# Patient Record
Sex: FEMALE | Race: WHITE | NOT HISPANIC OR LATINO | Employment: FULL TIME | URBAN - METROPOLITAN AREA
[De-identification: names, ages, dates, MRNs, and addresses within clinical notes are randomized per-mention and may not be internally consistent; named-entity substitution may affect disease eponyms.]

---

## 2017-04-13 ENCOUNTER — OFFICE VISIT (OUTPATIENT)
Dept: FAMILY MEDICINE | Facility: CLINIC | Age: 52
End: 2017-04-13
Payer: COMMERCIAL

## 2017-04-13 VITALS
HEIGHT: 65 IN | SYSTOLIC BLOOD PRESSURE: 110 MMHG | DIASTOLIC BLOOD PRESSURE: 66 MMHG | RESPIRATION RATE: 16 BRPM | OXYGEN SATURATION: 97 % | BODY MASS INDEX: 34.56 KG/M2 | WEIGHT: 207.44 LBS | HEART RATE: 76 BPM

## 2017-04-13 DIAGNOSIS — R09.81 NASAL CONGESTION: ICD-10-CM

## 2017-04-13 DIAGNOSIS — S81.832S: ICD-10-CM

## 2017-04-13 DIAGNOSIS — Z00.00 ANNUAL PHYSICAL EXAM: Primary | ICD-10-CM

## 2017-04-13 PROCEDURE — 99396 PREV VISIT EST AGE 40-64: CPT | Mod: S$GLB,,, | Performed by: NURSE PRACTITIONER

## 2017-04-13 PROCEDURE — 99999 PR PBB SHADOW E&M-EST. PATIENT-LVL III: CPT | Mod: PBBFAC,,, | Performed by: NURSE PRACTITIONER

## 2017-04-13 PROCEDURE — 1160F RVW MEDS BY RX/DR IN RCRD: CPT | Mod: S$GLB,,, | Performed by: NURSE PRACTITIONER

## 2017-04-13 RX ORDER — MUPIROCIN 20 MG/G
OINTMENT TOPICAL 2 TIMES DAILY
Qty: 15 G | Refills: 1 | Status: SHIPPED | OUTPATIENT
Start: 2017-04-13 | End: 2023-11-29

## 2017-04-13 RX ORDER — LEVOCETIRIZINE DIHYDROCHLORIDE 5 MG/1
5 TABLET, FILM COATED ORAL NIGHTLY
Qty: 30 TABLET | Refills: 2 | Status: SHIPPED | OUTPATIENT
Start: 2017-04-13 | End: 2023-11-29

## 2017-04-13 RX ORDER — PROMETHAZINE HYDROCHLORIDE 12.5 MG/1
25 TABLET ORAL 2 TIMES DAILY PRN
COMMUNITY
End: 2023-11-29

## 2017-04-13 RX ORDER — FLUTICASONE PROPIONATE 50 MCG
1 SPRAY, SUSPENSION (ML) NASAL DAILY
Qty: 1 BOTTLE | Refills: 3 | Status: SHIPPED | OUTPATIENT
Start: 2017-04-13 | End: 2017-06-13 | Stop reason: SDUPTHER

## 2017-04-13 NOTE — MR AVS SNAPSHOT
[facility]  [address]  [facility] [address]  Phone: [phone]  Fax: [phone]                  Rachel Boswell   2017 1:40 PM   Office Visit    Description:  Female : 1965   Provider:  Temitope Ayers NP   Department:  [facility]           Reason for Visit     Sinusitis     spot on leg           Diagnoses this Visit        Comments    Nasal congestion    -  Primary     Puncture wound, lower leg, left, sequela         Annual physical exam                To Do List           Future Appointments        Provider Department Dept Phone    2017 11:10 AM LAB, [place] [facility] [phone]      Goals (5 Years of Data)     None      Follow-Up and Disposition     Return if symptoms worsen or fail to improve.       These Medications        Disp Refills Start End    mupirocin (BACTROBAN) 2 % ointment 15 g 1 2017     Apply topically 2 (two) times daily. - Topical (Top)    Pharmacy: [facility] [address] [place] [address] [facility] AT [place] [address] & [employer] [facility] Ph #: [phone]       levocetirizine (XYZAL) 5 MG tablet 30 tablet 2 2017    Take 1 tablet (5 mg total) by mouth every evening. antihistamine - Oral    Pharmacy: [facility] [address] [place] [address] [facility] AT [facility] [address] & [employer] Ph #: [phone]       fluticasone (FLONASE) 50 mcg/actuation nasal spray 1 Bottle 3 2017     1 spray by Each Nare route once daily. - Each Nare    Pharmacy: [facility] [address] [place] [address] [facility] AT [facility] [address] & [employer] [facility] [facility] Ph #: [phone]         [facility]     [facility] Nurse Care Line - 24/ Assistance  Unless otherwise directed by your provider, please contact [facility], our nurse care line that is available for 24/7 assistance.     Registered nurses in the [facility] provide: appointment scheduling, clinical  advisement, health education, and other advisory services.  Call: [phone] (toll free)               Medications           Message regarding Medications     Verify the changes and/or additions to your medication regime listed below are the same as discussed with your clinician today.  If any of these changes or additions are incorrect, please notify your healthcare provider.        START taking these NEW medications        Refills    mupirocin (BACTROBAN) 2 % ointment 1    Sig: Apply topically 2 (two) times daily.    Class: Normal    Route: Topical (Top)    levocetirizine (XYZAL) 5 MG tablet 2    Sig: Take 1 tablet (5 mg total) by mouth every evening. antihistamine    Class: Normal    Route: Oral    fluticasone (FLONASE) 50 mcg/actuation nasal spray 3    Si spray by Each Nare route once daily.    Class: Normal    Route: Each Nare      STOP taking these medications     ondansetron (ZOFRAN-ODT) 4 MG TbDL Take 2 tablets (8 mg total) by mouth every 8 (eight) hours as needed.    hydrocodone-acetaminophen 5-325mg (NORCO) 5-325 mg per tablet Take 1 tablet by mouth every 4 (four) hours as needed for Pain.           Verify that the below list of medications is an accurate representation of the medications you are currently taking.  If none reported, the list may be blank. If incorrect, please contact your healthcare provider. Carry this list with you in case of emergency.           Current Medications     CARISOPRODOL (SOMA ORAL) Take 1 mg by mouth once daily.    diphenhydrAMINE (BENADRYL) 25 mg capsule Take 25 mg by mouth nightly as needed for Insomnia.    eletriptan (RELPAX) 40 MG tablet Take 40 mg by mouth as needed. may repeat in 2 hours if necessary    fluticasone (FLONASE) 50 mcg/actuation nasal spray 1 spray by Each Nare route once daily.    levocetirizine (XYZAL) 5 MG tablet Take 1 tablet (5 mg total) by mouth every evening. antihistamine    mupirocin (BACTROBAN) 2 % ointment Apply topically 2 (two) times  "daily.    promethazine (PHENERGAN) 12.5 MG Tab Take 25 mg by mouth 2 (two) times daily as needed.    propranolol (INDERAL) 80 MG tablet Take 80 mg by mouth 3 (three) times daily.    ranitidine (ZANTAC) 150 MG tablet Take 150 mg by mouth daily as needed for Heartburn.    sertraline (ZOLOFT) 50 MG tablet Take 50 mg by mouth every morning.           Clinical Reference Information           Your Vitals Were     BP Pulse Resp Height Weight SpO2    110/66 76 16 5' 5" (1.651 m) 94.1 kg (207 lb 7.3 oz) 97%    BMI                34.52 kg/m2          Blood Pressure          Most Recent Value    BP  110/66      Allergies as of 4/13/2017     No Known Allergies      Immunizations Administered on Date of Encounter - 4/13/2017     None      Orders Placed During Today's Visit     Future Labs/Procedures Expected by Expires    CBC auto differential  4/13/2017 6/12/2018    Comprehensive metabolic panel  4/13/2017 7/12/2017    Lipid panel  4/13/2017 6/12/2018    TSH  4/13/2017 6/12/2018      Language Assistance Services     ATTENTION: Language assistance services are available, free of charge. Please call [phone].      ATENCIÓN: Si habla nathan, tiene a pierre disposición servicios gratuitos de asistencia lingüística. Llame al [phone].     LUKASZ Ý: N?u b?n nói Ti?ng Vi?t, có các d?ch v? h? tr? ngôn ng? mi?n phí dành cho b?n. G?i s? [phone].         [facility] complies with applicable Federal civil rights laws and does not discriminate on the basis of race, color, national origin, age, disability, or sex.        "

## 2017-04-13 NOTE — PROGRESS NOTES
"Subjective:       Patient ID: Rachel Boswell is a 51 y.o. female.    Chief Complaint: Sinusitis and spot on leg  This is her first time being seen in the primary care clinic.  HPI   She is here to have her leg checked and complaints of sinus congestion for 2 weeks.  She states last provider appt was in October and she saw someone in [facility].  Vitals:    04/13/17 1400   BP: 110/66   Pulse: 76   Resp: 16     Past Medical History:   Diagnosis Date    Anxiety     Gallstones 2016    Migraine headache      Review of Systems   HENT: Positive for congestion, ear pain, postnasal drip and rhinorrhea.         Sinus congestion   Respiratory: Positive for cough.        Area on leg since April 1st. denies injury to site, however she had shaved her leg earlier that morning. States "looked like a burn initially"    Objective:      Physical Exam   Constitutional: She is oriented to person, place, and time. She appears well-developed and well-nourished.   HENT:   Head: Normocephalic and atraumatic.   Right Ear: External ear normal.   Left Ear: External ear normal.   Nose: Nose normal.   Mouth/Throat: Uvula is midline and oropharynx is clear and moist.       Eyes: Lids are normal.   Cardiovascular: Normal rate, regular rhythm and normal heart sounds.    Pulmonary/Chest: Effort normal and breath sounds normal.   Abdominal: Soft. Bowel sounds are normal.   Neurological: She is alert and oriented to person, place, and time.   Skin: Skin is warm and dry.        Psychiatric: She has a normal mood and affect. Her speech is normal and behavior is normal. Judgment and thought content normal. Cognition and memory are normal.   Nursing note and vitals reviewed.          Assessment & Plan:       Annual physical exam  -     CBC auto differential; Future; Expected date: 4/13/17  -     Comprehensive metabolic panel; Future; Expected date: 4/13/17  -     TSH; Future; Expected date: 4/13/17  -     Lipid panel; Future; Expected date: " 4/13/17    Nasal congestion  -     levocetirizine (XYZAL) 5 MG tablet; Take 1 tablet (5 mg total) by mouth every evening. antihistamine  Dispense: 30 tablet; Refill: 2  -     fluticasone (FLONASE) 50 mcg/actuation nasal spray; 1 spray by Each Nare route once daily.  Dispense: 1 Bottle; Refill: 3    Puncture wound, lower leg, left, sequela  -     mupirocin (BACTROBAN) 2 % ointment; Apply topically 2 (two) times daily.  Dispense: 15 g; Refill: 1    Sign up for my [facility]  I have completed her medical, surgical, family and social history during this visit      Return if symptoms worsen or fail to improve. Establish with Raffi at next available

## 2017-04-17 ENCOUNTER — LAB VISIT (OUTPATIENT)
Dept: LAB | Facility: HOSPITAL | Age: 52
End: 2017-04-17
Payer: COMMERCIAL

## 2017-04-17 DIAGNOSIS — Z00.00 ANNUAL PHYSICAL EXAM: ICD-10-CM

## 2017-04-17 LAB
ALBUMIN SERPL BCP-MCNC: 3 G/DL
ALP SERPL-CCNC: 59 U/L
ALT SERPL W/O P-5'-P-CCNC: 13 U/L
ANION GAP SERPL CALC-SCNC: 11 MMOL/L
AST SERPL-CCNC: 13 U/L
BASOPHILS # BLD AUTO: 0.01 K/UL
BASOPHILS NFR BLD: 0.2 %
BILIRUB SERPL-MCNC: 0.3 MG/DL
BUN SERPL-MCNC: 14 MG/DL
CALCIUM SERPL-MCNC: 9.2 MG/DL
CHLORIDE SERPL-SCNC: 108 MMOL/L
CHOLEST/HDLC SERPL: 3.9 {RATIO}
CO2 SERPL-SCNC: 25 MMOL/L
CREAT SERPL-MCNC: 0.6 MG/DL
DIFFERENTIAL METHOD: ABNORMAL
EOSINOPHIL # BLD AUTO: 0.2 K/UL
EOSINOPHIL NFR BLD: 2.9 %
ERYTHROCYTE [DISTWIDTH] IN BLOOD BY AUTOMATED COUNT: 12.9 %
EST. GFR  (AFRICAN AMERICAN): >60 ML/MIN/1.73 M^2
EST. GFR  (NON AFRICAN AMERICAN): >60 ML/MIN/1.73 M^2
GLUCOSE SERPL-MCNC: 92 MG/DL
HCT VFR BLD AUTO: 33 %
HDL/CHOLESTEROL RATIO: 25.9 %
HDLC SERPL-MCNC: 162 MG/DL
HDLC SERPL-MCNC: 42 MG/DL
HGB BLD-MCNC: 10.9 G/DL
LDLC SERPL CALC-MCNC: 97.4 MG/DL
LYMPHOCYTES # BLD AUTO: 1.6 K/UL
LYMPHOCYTES NFR BLD: 30.1 %
MCH RBC QN AUTO: 27.4 PG
MCHC RBC AUTO-ENTMCNC: 33 %
MCV RBC AUTO: 83 FL
MONOCYTES # BLD AUTO: 0.6 K/UL
MONOCYTES NFR BLD: 11.9 %
NEUTROPHILS # BLD AUTO: 2.8 K/UL
NEUTROPHILS NFR BLD: 54.7 %
NONHDLC SERPL-MCNC: 120 MG/DL
PLATELET # BLD AUTO: 414 K/UL
PMV BLD AUTO: 9.1 FL
POTASSIUM SERPL-SCNC: 4.2 MMOL/L
PROT SERPL-MCNC: 7.2 G/DL
RBC # BLD AUTO: 3.98 M/UL
SODIUM SERPL-SCNC: 144 MMOL/L
TRIGL SERPL-MCNC: 113 MG/DL
TSH SERPL DL<=0.005 MIU/L-ACNC: 1.52 UIU/ML
WBC # BLD AUTO: 5.19 K/UL

## 2017-04-17 PROCEDURE — 85025 COMPLETE CBC W/AUTO DIFF WBC: CPT

## 2017-04-17 PROCEDURE — 80053 COMPREHEN METABOLIC PANEL: CPT

## 2017-04-17 PROCEDURE — 80061 LIPID PANEL: CPT

## 2017-04-17 PROCEDURE — 84443 ASSAY THYROID STIM HORMONE: CPT

## 2017-04-17 PROCEDURE — 36415 COLL VENOUS BLD VENIPUNCTURE: CPT | Mod: PO

## 2017-05-16 ENCOUNTER — PATIENT OUTREACH (OUTPATIENT)
Dept: ADMINISTRATIVE | Facility: HOSPITAL | Age: 52
End: 2017-05-16

## 2017-05-24 ENCOUNTER — PATIENT OUTREACH (OUTPATIENT)
Dept: ADMINISTRATIVE | Facility: HOSPITAL | Age: 52
End: 2017-05-24

## 2017-05-24 NOTE — LETTER
May 24, 2017    Rachel Boswell  [address] [place] [address]             [facility]  [address]  [facility] [address]  Phone: [phone] Dear Ms. Boswell:    We have tried to reach you by mychart unsuccessfully.      [facility] is committed to your overall health.  To help you get the most out of each of your visits, we will review your information to make sure you are up to date on all of your recommended tests and/or procedures.       Dr. Nugent         has found that you may be due for:     One-time Hepatitis C Screening lab test(a viral condition that can harm the liver)   Tetanus immunization   Pap smear   Mammogram   colonoscopy     If you have had any of the above done at another facility, please bring the records or information with you so that your record at [facility] will be complete.      If you are currently taking medication , please bring it with you to your appointment for review.     If you have any questions or concerns, please don't hesitate to call.    Sincerely,  Randa Castelan  Clinical Care Coordinator  [facility] [address] [facility] [address]  [place] [address]  Phone: [phone]   Fax: [phone]

## 2017-05-31 ENCOUNTER — OFFICE VISIT (OUTPATIENT)
Dept: FAMILY MEDICINE | Facility: CLINIC | Age: 52
End: 2017-05-31
Payer: COMMERCIAL

## 2017-05-31 ENCOUNTER — LAB VISIT (OUTPATIENT)
Dept: LAB | Facility: HOSPITAL | Age: 52
End: 2017-05-31
Attending: FAMILY MEDICINE
Payer: COMMERCIAL

## 2017-05-31 VITALS
HEART RATE: 76 BPM | RESPIRATION RATE: 12 BRPM | HEIGHT: 65 IN | DIASTOLIC BLOOD PRESSURE: 74 MMHG | BODY MASS INDEX: 33.83 KG/M2 | WEIGHT: 203.06 LBS | TEMPERATURE: 98 F | SYSTOLIC BLOOD PRESSURE: 118 MMHG

## 2017-05-31 DIAGNOSIS — Z11.59 NEED FOR HEPATITIS C SCREENING TEST: ICD-10-CM

## 2017-05-31 DIAGNOSIS — Z12.11 SCREENING FOR COLON CANCER: ICD-10-CM

## 2017-05-31 DIAGNOSIS — Z12.4 SCREENING FOR CERVICAL CANCER: ICD-10-CM

## 2017-05-31 DIAGNOSIS — D64.9 ANEMIA, UNSPECIFIED TYPE: Primary | ICD-10-CM

## 2017-05-31 DIAGNOSIS — G43.909 MIGRAINE WITHOUT STATUS MIGRAINOSUS, NOT INTRACTABLE, UNSPECIFIED MIGRAINE TYPE: ICD-10-CM

## 2017-05-31 DIAGNOSIS — D64.9 ANEMIA, UNSPECIFIED TYPE: ICD-10-CM

## 2017-05-31 DIAGNOSIS — Z12.39 SCREENING FOR BREAST CANCER: ICD-10-CM

## 2017-05-31 LAB
BASOPHILS # BLD AUTO: 0.02 K/UL
BASOPHILS NFR BLD: 0.3 %
DIFFERENTIAL METHOD: NORMAL
EOSINOPHIL # BLD AUTO: 0.2 K/UL
EOSINOPHIL NFR BLD: 3.1 %
ERYTHROCYTE [DISTWIDTH] IN BLOOD BY AUTOMATED COUNT: 13.7 %
FERRITIN SERPL-MCNC: 198 NG/ML
FOLATE SERPL-MCNC: 6.5 NG/ML
HCT VFR BLD AUTO: 39.3 %
HGB BLD-MCNC: 13 G/DL
IRON SERPL-MCNC: 65 UG/DL
LYMPHOCYTES # BLD AUTO: 2.1 K/UL
LYMPHOCYTES NFR BLD: 32.6 %
MCH RBC QN AUTO: 27.8 PG
MCHC RBC AUTO-ENTMCNC: 33.1 %
MCV RBC AUTO: 84 FL
MONOCYTES # BLD AUTO: 0.8 K/UL
MONOCYTES NFR BLD: 11.9 %
NEUTROPHILS # BLD AUTO: 3.3 K/UL
NEUTROPHILS NFR BLD: 51.9 %
PLATELET # BLD AUTO: 321 K/UL
PMV BLD AUTO: 9.5 FL
RBC # BLD AUTO: 4.67 M/UL
SATURATED IRON: 16 %
TOTAL IRON BINDING CAPACITY: 417 UG/DL
TRANSFERRIN SERPL-MCNC: 282 MG/DL
VIT B12 SERPL-MCNC: 560 PG/ML
WBC # BLD AUTO: 6.41 K/UL

## 2017-05-31 PROCEDURE — 82728 ASSAY OF FERRITIN: CPT

## 2017-05-31 PROCEDURE — 99213 OFFICE O/P EST LOW 20 MIN: CPT | Mod: S$GLB,,, | Performed by: FAMILY MEDICINE

## 2017-05-31 PROCEDURE — 86803 HEPATITIS C AB TEST: CPT

## 2017-05-31 PROCEDURE — 83540 ASSAY OF IRON: CPT

## 2017-05-31 PROCEDURE — 82607 VITAMIN B-12: CPT

## 2017-05-31 PROCEDURE — 99999 PR PBB SHADOW E&M-EST. PATIENT-LVL III: CPT | Mod: PBBFAC,,, | Performed by: FAMILY MEDICINE

## 2017-05-31 PROCEDURE — 85025 COMPLETE CBC W/AUTO DIFF WBC: CPT

## 2017-05-31 PROCEDURE — 36415 COLL VENOUS BLD VENIPUNCTURE: CPT | Mod: PO

## 2017-05-31 PROCEDURE — 82746 ASSAY OF FOLIC ACID SERUM: CPT

## 2017-05-31 RX ORDER — CARISOPRODOL 350 MG/1
TABLET ORAL
Refills: 0 | COMMUNITY
Start: 2017-05-26 | End: 2021-11-17

## 2017-05-31 NOTE — PROGRESS NOTES
Subjective:       Patient ID: Rachel Boswell is a 51 y.o. female.    Chief Complaint: Establish Care    Here to establish care.  Had anemia last check.      Review of Systems   Constitutional: Negative for chills, fatigue and fever.   Respiratory: Negative for cough, chest tightness and shortness of breath.    Cardiovascular: Negative for chest pain, palpitations and leg swelling.   Gastrointestinal: Negative for abdominal distention and abdominal pain.   Endocrine: Negative for cold intolerance and heat intolerance.   Skin: Negative for rash.       Objective:      Physical Exam   Constitutional: She appears well-developed and well-nourished.   HENT:   Head: Normocephalic and atraumatic.   Cardiovascular: Normal rate, regular rhythm and normal heart sounds.    Pulmonary/Chest: Effort normal and breath sounds normal.   Psychiatric: She has a normal mood and affect.   Nursing note and vitals reviewed.      Assessment:       1. Anemia, unspecified type    2. Need for hepatitis C screening test    3. Screening for cervical cancer    4. Screening for breast cancer    5. Screening for colon cancer    6. Migraine without status migrainosus, not intractable, unspecified migraine type        Plan:       Anemia, unspecified type  -     Ferritin; Future; Expected date: 05/31/2017  -     Iron and TIBC; Future; Expected date: 05/31/2017  -     Vitamin B12; Future; Expected date: 05/31/2017  -     Folate; Future; Expected date: 05/31/2017  -     CBC auto differential; Future; Expected date: 05/31/2017    Need for hepatitis C screening test  -     Hepatitis C antibody; Future; Expected date: 05/31/2017    Screening for cervical cancer  -     Ambulatory referral to Gynecology    Screening for breast cancer  -     Mammo Digital Screening Bilat with CAD; Future; Expected date: 05/31/2017    Screening for colon cancer  -     Case request GI: COLONOSCOPY    Migraine without status migrainosus, not intractable, unspecified migraine  type      Will monitor chronic medical issues and continue current plan of care.  Update labs regarding anemia.  tdap at [facility].  Return in about 6 months (around 11/30/2017), or if symptoms worsen or fail to improve.

## 2017-06-01 LAB — HCV AB SERPL QL IA: NEGATIVE

## 2017-06-13 DIAGNOSIS — R09.81 NASAL CONGESTION: ICD-10-CM

## 2017-06-14 RX ORDER — FLUTICASONE PROPIONATE 50 MCG
SPRAY, SUSPENSION (ML) NASAL
Qty: 1 BOTTLE | Refills: 2 | Status: SHIPPED | OUTPATIENT
Start: 2017-06-14 | End: 2022-12-23

## 2017-07-14 ENCOUNTER — TELEPHONE (OUTPATIENT)
Dept: FAMILY MEDICINE | Facility: CLINIC | Age: 52
End: 2017-07-14

## 2017-07-14 NOTE — TELEPHONE ENCOUNTER
Patient would like to schedule colonoscopy. Works at  center, Please try multiple times to call. Thank you.

## 2017-07-14 NOTE — TELEPHONE ENCOUNTER
----- Message from Radha Rodriguez sent at 7/14/2017  8:30 AM CDT -----  Contact: self  Patient [phone] is calling/she states that Dr Nugent is referring patient for a colonoscopy/please call

## 2017-08-17 ENCOUNTER — SURGERY (OUTPATIENT)
Age: 52
End: 2017-08-17

## 2017-08-17 ENCOUNTER — ANESTHESIA EVENT (OUTPATIENT)
Dept: ENDOSCOPY | Facility: HOSPITAL | Age: 52
End: 2017-08-17
Payer: COMMERCIAL

## 2017-08-17 ENCOUNTER — ANESTHESIA (OUTPATIENT)
Dept: ENDOSCOPY | Facility: HOSPITAL | Age: 52
End: 2017-08-17
Payer: COMMERCIAL

## 2017-08-17 ENCOUNTER — HOSPITAL ENCOUNTER (OUTPATIENT)
Facility: HOSPITAL | Age: 52
Discharge: HOME | End: 2017-08-17
Attending: INTERNAL MEDICINE | Admitting: INTERNAL MEDICINE
Payer: COMMERCIAL

## 2017-08-17 VITALS
OXYGEN SATURATION: 98 % | HEIGHT: 65 IN | DIASTOLIC BLOOD PRESSURE: 63 MMHG | TEMPERATURE: 98 F | BODY MASS INDEX: 33.32 KG/M2 | HEART RATE: 68 BPM | SYSTOLIC BLOOD PRESSURE: 131 MMHG | WEIGHT: 200 LBS | RESPIRATION RATE: 18 BRPM

## 2017-08-17 VITALS — RESPIRATION RATE: 33 BRPM

## 2017-08-17 DIAGNOSIS — Z12.11 COLON CANCER SCREENING: ICD-10-CM

## 2017-08-17 PROCEDURE — D9220A PRA ANESTHESIA: Mod: 33,CRNA,, | Performed by: NURSE ANESTHETIST, CERTIFIED REGISTERED

## 2017-08-17 PROCEDURE — D9220A PRA ANESTHESIA: Mod: 33,ANES,, | Performed by: ANESTHESIOLOGY

## 2017-08-17 PROCEDURE — 45385 COLONOSCOPY W/LESION REMOVAL: CPT | Mod: 33,,, | Performed by: INTERNAL MEDICINE

## 2017-08-17 PROCEDURE — 37000009 HC ANESTHESIA EA ADD 15 MINS: Mod: PO | Performed by: INTERNAL MEDICINE

## 2017-08-17 PROCEDURE — 45385 COLONOSCOPY W/LESION REMOVAL: CPT | Mod: PO | Performed by: INTERNAL MEDICINE

## 2017-08-17 PROCEDURE — 27201089 HC SNARE, DISP (ANY): Mod: PO | Performed by: INTERNAL MEDICINE

## 2017-08-17 PROCEDURE — 25000003 PHARM REV CODE 250: Mod: PO | Performed by: INTERNAL MEDICINE

## 2017-08-17 PROCEDURE — 88305 TISSUE EXAM BY PATHOLOGIST: CPT | Performed by: PATHOLOGY

## 2017-08-17 PROCEDURE — 37000008 HC ANESTHESIA 1ST 15 MINUTES: Mod: PO | Performed by: INTERNAL MEDICINE

## 2017-08-17 PROCEDURE — 63600175 PHARM REV CODE 636 W HCPCS: Mod: PO | Performed by: NURSE ANESTHETIST, CERTIFIED REGISTERED

## 2017-08-17 PROCEDURE — 25000003 PHARM REV CODE 250: Mod: PO | Performed by: ANESTHESIOLOGY

## 2017-08-17 RX ORDER — SODIUM CHLORIDE, SODIUM LACTATE, POTASSIUM CHLORIDE, CALCIUM CHLORIDE 600; 310; 30; 20 MG/100ML; MG/100ML; MG/100ML; MG/100ML
INJECTION, SOLUTION INTRAVENOUS CONTINUOUS
Status: DISCONTINUED | OUTPATIENT
Start: 2017-08-17 | End: 2017-08-17 | Stop reason: HOSPADM

## 2017-08-17 RX ORDER — LIDOCAINE HYDROCHLORIDE 10 MG/ML
1 INJECTION, SOLUTION EPIDURAL; INFILTRATION; INTRACAUDAL; PERINEURAL ONCE
Status: COMPLETED | OUTPATIENT
Start: 2017-08-17 | End: 2017-08-17

## 2017-08-17 RX ORDER — PROPOFOL 10 MG/ML
VIAL (ML) INTRAVENOUS CONTINUOUS PRN
Status: DISCONTINUED | OUTPATIENT
Start: 2017-08-17 | End: 2017-08-17

## 2017-08-17 RX ORDER — PROPOFOL 10 MG/ML
VIAL (ML) INTRAVENOUS
Status: DISCONTINUED | OUTPATIENT
Start: 2017-08-17 | End: 2017-08-17

## 2017-08-17 RX ORDER — LIDOCAINE HCL/PF 100 MG/5ML
SYRINGE (ML) INTRAVENOUS
Status: DISCONTINUED | OUTPATIENT
Start: 2017-08-17 | End: 2017-08-17

## 2017-08-17 RX ADMIN — LIDOCAINE HYDROCHLORIDE: 10 INJECTION, SOLUTION EPIDURAL; INFILTRATION; INTRACAUDAL; PERINEURAL at 07:08

## 2017-08-17 RX ADMIN — SODIUM CHLORIDE, SODIUM LACTATE, POTASSIUM CHLORIDE, AND CALCIUM CHLORIDE: .6; .31; .03; .02 INJECTION, SOLUTION INTRAVENOUS at 07:08

## 2017-08-17 RX ADMIN — PROPOFOL 80 MG: 10 INJECTION, EMULSION INTRAVENOUS at 08:08

## 2017-08-17 RX ADMIN — LIDOCAINE HYDROCHLORIDE 50 MG: 20 INJECTION, SOLUTION INTRAVENOUS at 08:08

## 2017-08-17 RX ADMIN — PROPOFOL 200 MCG/KG/MIN: 10 INJECTION, EMULSION INTRAVENOUS at 08:08

## 2017-08-17 NOTE — ANESTHESIA POSTPROCEDURE EVALUATION
"Anesthesia Post Evaluation    Patient: Rachel Boswell    Procedure(s) Performed: Procedure(s) (LRB):  COLONOSCOPY (N/A)    Final Anesthesia Type: general  Patient location during evaluation: PACU  Patient participation: Yes- Able to Participate  Level of consciousness: awake and alert and oriented  Post-procedure vital signs: reviewed and stable  Pain management: adequate  Airway patency: patent  PONV status at discharge: No PONV  Anesthetic complications: no      Cardiovascular status: hemodynamically stable  Respiratory status: unassisted, spontaneous ventilation and room air  Hydration status: euvolemic  Follow-up not needed.        Visit Vitals  /63   Pulse 68   Temp 36.6 °C (97.8 °F) (Skin)   Resp 18   Ht 5' 5" (1.651 m)   Wt 90.7 kg (200 lb)   SpO2 98%   Breastfeeding? No   BMI 33.28 kg/m²       Pain/Sam Score: Pain Assessment Performed: Yes (8/17/2017  9:14 AM)  Presence of Pain: denies (8/17/2017  9:14 AM)  Sam Score: 10 (8/17/2017  9:14 AM)      "

## 2017-08-17 NOTE — ANESTHESIA PREPROCEDURE EVALUATION
08/17/2017  Rachel Boswell is a 51 y.o., female.    Anesthesia Evaluation    I have reviewed the Patient Summary Reports.    I have reviewed the Nursing Notes.   I have reviewed the Medications.     Review of Systems  Anesthesia Hx:  No problems with previous Anesthesia    Social:  Former Smoker    Cardiovascular:  Cardiovascular Normal Exercise tolerance: good  ECG has been reviewed.    Pulmonary:  Pulmonary Normal    Hepatic/GI:  Hepatic/GI Normal    Neurological:   Headaches    Endocrine:  Endocrine Normal    Psych:   anxiety          Physical Exam  General:  Obesity    Airway/Jaw/Neck:  Airway Findings: Mouth Opening: Normal Mallampati: III  Improves to II with phonation.  TM Distance: 4 - 6 cm       Chest/Lungs:  Chest/Lungs Findings: Clear to auscultation, Normal Respiratory Rate     Heart/Vascular:  Heart Findings: Rate: Normal  Rhythm: Regular Rhythm        Mental Status:  Mental Status Findings:  Cooperative, Alert and Oriented         Anesthesia Plan  Type of Anesthesia, risks & benefits discussed:  Anesthesia Type:  general  Patient's Preference:   Intra-op Monitoring Plan: standard ASA monitors  Intra-op Monitoring Plan Comments:   Post Op Pain Control Plan:   Post Op Pain Control Plan Comments:   Induction:   IV  Beta Blocker:  Patient is on a Beta-Blocker and has received one dose within the past 24 hours (No further documentation required).       Informed Consent: Patient understands risks and agrees with Anesthesia plan.  Questions answered. Anesthesia consent signed with patient.  ASA Score: 2     Day of Surgery Review of History & Physical: I have interviewed and examined the patient. I have reviewed the patient's H&P dated:  There are no significant changes.          Ready For Surgery From Anesthesia Perspective.

## 2017-08-17 NOTE — TRANSFER OF CARE
"Anesthesia Transfer of Care Note    Patient: Rachel Boswell    Procedure(s) Performed: Procedure(s) (LRB):  COLONOSCOPY (N/A)    Patient location: PACU    Anesthesia Type: general    Transport from OR: Transported from OR on room air with adequate spontaneous ventilation    Post pain: adequate analgesia    Post assessment: no apparent anesthetic complications    Post vital signs: stable    Level of consciousness: awake, alert and oriented    Nausea/Vomiting: no nausea/vomiting    Complications: none    Transfer of care protocol was followed      Last vitals:   Visit Vitals  /65 (BP Location: Right arm, Patient Position: Lying)   Pulse 65   Temp 36.6 °C (97.9 °F) (Skin)   Resp 16   Ht 5' 5" (1.651 m)   Wt 90.7 kg (200 lb)   SpO2 97%   Breastfeeding? No   BMI 33.28 kg/m²     "

## 2017-08-17 NOTE — BRIEF OP NOTE
Discharge Note  Short Stay      SUMMARY     Admit Date: 8/17/2017    Attending Physician: Emory Ramos Jr., MD     Discharge Physician: Emory Ramos Jr., MD    Discharge Date: 8/17/2017 8:58 AM    Final Diagnosis: Screen for colon cancer [Z12.11]    One 2 mm polyp (hyperplastic?) in the sigmoid colon.  Otherwise normal colon and TI.    Disposition: HOME OR SELF CARE    Patient Instructions:   Current Discharge Medication List      CONTINUE these medications which have NOT CHANGED    Details   carisoprodol (SOMA) 350 MG tablet TK 1 T PO QD  Refills: 0      diphenhydrAMINE (BENADRYL) 25 mg capsule Take 25 mg by mouth nightly as needed for Insomnia.      eletriptan (RELPAX) 40 MG tablet Take 40 mg by mouth as needed. may repeat in 2 hours if necessary      propranolol (INDERAL) 80 MG tablet Take 80 mg by mouth 3 (three) times daily.      sertraline (ZOLOFT) 50 MG tablet Take 75 mg by mouth every morning. Incresed to 75mg form 50 mg by Dr Clarke- neurologist      fluticasone (FLONASE) 50 mcg/actuation nasal spray SHAKE LIQUID AND USE 1 SPRAY IN EACH NOSTRIL EVERY DAY  Qty: 1 Bottle, Refills: 2    Associated Diagnoses: Nasal congestion      levocetirizine (XYZAL) 5 MG tablet Take 1 tablet (5 mg total) by mouth every evening. antihistamine  Qty: 30 tablet, Refills: 2    Associated Diagnoses: Nasal congestion      mupirocin (BACTROBAN) 2 % ointment Apply topically 2 (two) times daily.  Qty: 15 g, Refills: 1    Associated Diagnoses: Puncture wound, lower leg, left, sequela      promethazine (PHENERGAN) 12.5 MG Tab Take 25 mg by mouth 2 (two) times daily as needed.             Discharge Procedure Orders (must include Diet, Follow-up, Activity)    Follow Up:  Follow up with PCP as per your routine.  Please follow a high fiber diet.  Activity as tolerated.    No driving day of procedure.    PROBIOTICS:  Now that your colon is so cleaned out, now is a good time for a round of PROBIOTICS.  Eat a container of Greek Yogurt,  such as OIKOS or CHOBANI,  Or Activia or Dannon    Greek Yogurt.    Or Take a similar Probiotic product such as Align or Culturelle or Shayy-Q, every day for a month.                  (The products listed are non-prescription, but you may need to ask the pharmacist for their location.)  Repeat this 3-4 times a year.

## 2017-08-17 NOTE — H&P
History & Physical - Short Stay  Gastroenterology      SUBJECTIVE:     Procedure: Colonoscopy    Chief Complaint/Indication for Procedure: Screening    History of Present Illness:  Asymptomatic    Office Visit     5/31/2017  [facility]   BEATRIZ Nugent MD   Family Medicine   Anemia, unspecified type +5 more   Dx   Establish Care   Reason for Visit    Progress Notes        Subjective:       Patient ID: Gideon Lopez is a 51 y.o. female.     Chief Complaint: Establish Care     Here to establish care.  Had anemia last check.    Assessment:       1. Anemia, unspecified type    2. Need for hepatitis C screening test    3. Screening for cervical cancer    4. Screening for breast cancer    5. Screening for colon cancer    6. Migraine without status migrainosus, not intractable, unspecified migraine type        Plan:       Anemia, unspecified type  -     Ferritin; Future; Expected date: 05/31/2017  -     Iron and TIBC; Future; Expected date: 05/31/2017  -     Vitamin B12; Future; Expected date: 05/31/2017  -     Folate; Future; Expected date: 05/31/2017  -     CBC auto differential; Future; Expected date: 05/31/2017     Need for hepatitis C screening test  -     Hepatitis C antibody; Future; Expected date: 05/31/2017     Screening for cervical cancer  -     Ambulatory referral to Gynecology     Screening for breast cancer  -     Mammo Digital Screening Bilat with CAD; Future; Expected date: 05/31/2017     Screening for colon cancer  -     Case request GI: COLONOSCOPY     Migraine without status migrainosus, not intractable, unspecified migraine type        Will monitor chronic medical issues and continue current plan of care.  Update labs regarding anemia.  tdap at [facility].  Return in about 6 months (around 11/30/2017), or if symptoms worsen or fail to improve.              Results for GIDEON LOPEZ (MRN [id number]) as of 8/16/2017 22:06   Ref. Range 8/13/2016 16:42 4/17/2017 09:04  5/31/2017 14:48   WBC Latest Ref Range: 3.90 - 12.70 K/uL 8.58 5.19 6.41   RBC Latest Ref Range: 4.00 - 5.40 M/uL 4.63 3.98 (L) 4.67   Hemoglobin Latest Ref Range: 12.0 - 16.0 g/dL 12.9 10.9 (L) 13.0   Hematocrit Latest Ref Range: 37.0 - 48.5 % 39.6 33.0 (L) 39.3   MCV Latest Ref Range: 82 - 98 fL 86 83 84   MCH Latest Ref Range: 27.0 - 31.0 pg 27.9 27.4 27.8   MCHC Latest Ref Range: 32.0 - 36.0 % 32.6 33.0 33.1   RDW Latest Ref Range: 11.5 - 14.5 % 13.7 12.9 13.7   Platelets Latest Ref Range: 150 - 350 K/uL 306 414 (H) 321         PTA Medications   Medication Sig    carisoprodol (SOMA) 350 MG tablet TK 1 T PO QD    diphenhydrAMINE (BENADRYL) 25 mg capsule Take 25 mg by mouth nightly as needed for Insomnia.    eletriptan (RELPAX) 40 MG tablet Take 40 mg by mouth as needed. may repeat in 2 hours if necessary    propranolol (INDERAL) 80 MG tablet Take 80 mg by mouth 3 (three) times daily.    sertraline (ZOLOFT) 50 MG tablet Take 75 mg by mouth every morning. Incresed to 75mg form 50 mg by Dr Clarke- neurologist    fluticasone (FLONASE) 50 mcg/actuation nasal spray SHAKE LIQUID AND USE 1 SPRAY IN EACH NOSTRIL EVERY DAY    levocetirizine (XYZAL) 5 MG tablet Take 1 tablet (5 mg total) by mouth every evening. antihistamine    mupirocin (BACTROBAN) 2 % ointment Apply topically 2 (two) times daily.    promethazine (PHENERGAN) 12.5 MG Tab Take 25 mg by mouth 2 (two) times daily as needed.       Review of patient's allergies indicates:  No Known Allergies     Past Medical History:   Diagnosis Date    Anxiety     Gallstones 2016    Migraine headache      Past Surgical History:   Procedure Laterality Date    ovary removed Right      Family History   Problem Relation Age of Onset    Diabetes Mother     Heart disease Father     COPD Father      Social History   Substance Use Topics    Smoking status: Former Smoker     Quit date: 2003    Smokeless tobacco: Never Used    Alcohol use 0.6 - 1.2 oz/week     1 - 2  "Cans of beer per week      Comment: once month         OBJECTIVE:     Vital Signs (Most Recent)  Temp: 97.9 °F (36.6 °C) (08/17/17 0741)  Pulse: 65 (08/17/17 0741)  Resp: 16 (08/17/17 0741)  BP: 126/65 (08/17/17 0741)  SpO2: 97 % (08/17/17 0741)    Physical Exam:           :   Ht 5' 5" (1.651 m)    Wt 92.1 kg (203 lb 0.7 oz)    BMI 33.79 kg/m²                    GENERAL:  Comfortable, in no acute distress.                                 HEENT EXAM:  Nonicteric.  No adenopathy.  Oropharynx is clear.               NECK:  Supple.                                                               LUNGS:  Clear.                                                               CARDIAC:  Regular rate and rhythm.  S1, S2.  No murmur.                      ABDOMEN:  Soft, positive bowel sounds, nontender.  No hepatosplenomegaly or masses.  No rebound or guarding.                              EXTREMITIES:  No edema.     MENTAL STATUS:  Alert and oriented.    ASSESSMENT/PLAN:     Assessment: Colorectal cancer screening.   Anemia, resolved.    Plan: Colonoscopy    Anesthesia Plan:   MAC / General Anaesthesia    ASA Grade: ASA 2 - Patient with mild systemic disease with no functional limitations    MALLAMPATI SCORE: II (hard and soft palate, upper portion of tonsils anduvula visible)    "

## 2017-08-17 NOTE — DISCHARGE INSTRUCTIONS
Procedural Sedation (Adult)  You have been given medicine by vein to make you sleep during your surgery. This may have included both a pain medicine and sleeping medicine. Most of the effects have worn off. But you may still have some drowsiness for the next 6 to 8 hours.  Home care  Follow these guidelines when you get home:  · For the next 8 hours, you should be watched by a responsible adult. This person should make sure your condition is not getting worse.  · Don't take any medicine by mouth for pain or for sleep during the next 4 hours. These might react with the medicines you were given in the hospital. This could cause a much stronger response than usual.  · Don't drink any alcohol for the next 24 hours.  · Don't drive, operate dangerous machinery, or make important business or personal decisions during the next 24 hours.  Follow-up care  Follow up with your healthcare provider if you are not alert and back to your usual level of activity within 12 hours.  When to seek medical advice  Call your healthcare provider right away if any of these occur:  · Drowsiness gets worse  · Weakness or dizziness gets worse  · Repeated vomiting  · You cannot be awakened   Date Last Reviewed: 10/18/2016  © 7463-1877 [employer]. [address] [place] [address]. All rights reserved. This information is not intended as a substitute for professional medical care. Always follow your healthcare professional's instructions.      PROBIOTICS:  Now that your colon is so cleaned out, now is a good time for a round of PROBIOTICS.  Eat a container of Greek Yogurt, such as OIKOS or CHOBANI,  Or Activia or Dannon    Greek Yogurt.    Or Take a similar Probiotic product such as Align or Culturelle or Shayy-Q, every day for a month.                  (The products listed are non-prescription, but you may need to ask the pharmacist for their location.)  Repeat this 3-4 times a year.        High-Fiber Diet  Fiber is in  fruits, vegetables, cereals, and grains. Fiber passes through your body undigested. A high-fiber diet helps food move through your intestinal tract. The added bulk is helpful in preventing constipation. In people with diverticulosis, fiber helps clean out the pouches along the colon wall. It also prevents new pouches from forming. A high-fiber diet reduces the risk of colon cancer. It also lowers blood cholesterol and prevents high blood sugar in people with diabetes.    The fiber-rich foods listed below should be part of your diet. If you are not used to high-fiber foods, start with 1 or 2 foods from this list. Every 3 to 4 days add a new one to your diet. Do this until you are eating 4 high-fiber foods per day. This should give you 20 to 35 grams of fiber a day. It is also important to drink a lot of water when you are on this diet. You should have 6 to 8 glasses of water a day. Water makes the fiber swell and increases the benefit.  Foods high in dietary fiber  The following foods are high in dietary fiber:  · Breads. Breads made with 100% whole-wheat flour; claude, wheat, or rye crackers; whole-grain tortillas, bran muffins.  · Cereals. Whole-grain and bran cereals with bran (shredded wheat, wheat flakes, raisin bran, corn bran); oatmeal, rolled oats, granola, and brown rice.  · Fruits. Fresh fruits and their edible skins (pears, prunes, raisins, berries, apples, and apricots); bananas, citrus fruit, mangoes, pineapple; and prune juice.  · Nuts. Any nuts and seeds.  · Vegetables. Best served raw or lightly cooked. All types, especially: green peas, celery, eggplant, potatoes, spinach, broccoli, [place] sprouts, winter squash, carrots, cauliflower, soybeans, lentils, and fresh and dried beans of all kinds.  · Other. Popcorn, any spices.  Date Last Reviewed: 8/1/2016  © 9749-3488 [employer]. [address] [place] [address], [place] [address]. All rights reserved. This information is not intended as a  substitute for professional medical care. Always follow your healthcare professional's instructions.

## 2018-06-14 DIAGNOSIS — Z12.39 BREAST CANCER SCREENING: ICD-10-CM

## 2019-07-16 ENCOUNTER — HOSPITAL ENCOUNTER (OUTPATIENT)
Dept: RADIOLOGY | Facility: HOSPITAL | Age: 54
Discharge: HOME | End: 2019-07-16
Attending: FAMILY MEDICINE
Payer: COMMERCIAL

## 2019-07-16 VITALS — WEIGHT: 200 LBS | BODY MASS INDEX: 33.32 KG/M2 | HEIGHT: 65 IN

## 2019-07-16 DIAGNOSIS — Z12.39 BREAST CANCER SCREENING: ICD-10-CM

## 2019-07-16 PROCEDURE — 77067 SCR MAMMO BI INCL CAD: CPT | Mod: TC,PO

## 2019-07-16 PROCEDURE — 77063 MAMMO DIGITAL SCREENING BILAT WITH TOMOSYNTHESIS_CAD: ICD-10-PCS | Mod: 26,,, | Performed by: RADIOLOGY

## 2019-07-16 PROCEDURE — 77067 SCR MAMMO BI INCL CAD: CPT | Mod: 26,,, | Performed by: RADIOLOGY

## 2019-07-16 PROCEDURE — 77067 MAMMO DIGITAL SCREENING BILAT WITH TOMOSYNTHESIS_CAD: ICD-10-PCS | Mod: 26,,, | Performed by: RADIOLOGY

## 2019-07-16 PROCEDURE — 77063 BREAST TOMOSYNTHESIS BI: CPT | Mod: 26,,, | Performed by: RADIOLOGY

## 2021-11-17 ENCOUNTER — OFFICE VISIT (OUTPATIENT)
Dept: FAMILY MEDICINE | Facility: CLINIC | Age: 56
End: 2021-11-17
Payer: COMMERCIAL

## 2021-11-17 VITALS
HEIGHT: 65 IN | HEART RATE: 88 BPM | OXYGEN SATURATION: 99 % | TEMPERATURE: 98 F | BODY MASS INDEX: 33.28 KG/M2 | SYSTOLIC BLOOD PRESSURE: 138 MMHG | DIASTOLIC BLOOD PRESSURE: 82 MMHG

## 2021-11-17 DIAGNOSIS — Z12.31 SCREENING MAMMOGRAM FOR BREAST CANCER: ICD-10-CM

## 2021-11-17 DIAGNOSIS — M54.2 DISCOMFORT OF NECK: ICD-10-CM

## 2021-11-17 DIAGNOSIS — F41.9 ANXIETY: ICD-10-CM

## 2021-11-17 DIAGNOSIS — Z00.00 ANNUAL PHYSICAL EXAM: Primary | ICD-10-CM

## 2021-11-17 DIAGNOSIS — G43.009 MIGRAINE WITHOUT AURA AND WITHOUT STATUS MIGRAINOSUS, NOT INTRACTABLE: ICD-10-CM

## 2021-11-17 DIAGNOSIS — I10 HYPERTENSION, UNSPECIFIED TYPE: ICD-10-CM

## 2021-11-17 DIAGNOSIS — Z11.4 SCREENING FOR HIV (HUMAN IMMUNODEFICIENCY VIRUS): ICD-10-CM

## 2021-11-17 PROCEDURE — 99999 PR PBB SHADOW E&M-EST. PATIENT-LVL V: CPT | Mod: PBBFAC,,, | Performed by: NURSE PRACTITIONER

## 2021-11-17 PROCEDURE — 99999 PR PBB SHADOW E&M-EST. PATIENT-LVL V: ICD-10-PCS | Mod: PBBFAC,,, | Performed by: NURSE PRACTITIONER

## 2021-11-17 PROCEDURE — 99386 PR PREVENTIVE VISIT,NEW,40-64: ICD-10-PCS | Mod: S$GLB,,, | Performed by: NURSE PRACTITIONER

## 2021-11-17 PROCEDURE — 99386 PREV VISIT NEW AGE 40-64: CPT | Mod: S$GLB,,, | Performed by: NURSE PRACTITIONER

## 2021-11-17 RX ORDER — PROPRANOLOL HYDROCHLORIDE 80 MG/1
80 TABLET ORAL 3 TIMES DAILY
Qty: 90 TABLET | Refills: 5 | Status: SHIPPED | OUTPATIENT
Start: 2021-11-17 | End: 2022-06-15

## 2021-11-17 RX ORDER — METHOCARBAMOL 500 MG/1
500 TABLET, FILM COATED ORAL NIGHTLY PRN
Qty: 30 TABLET | Refills: 5 | Status: SHIPPED | OUTPATIENT
Start: 2021-11-17 | End: 2021-11-27

## 2021-11-17 RX ORDER — SERTRALINE HYDROCHLORIDE 50 MG/1
75 TABLET, FILM COATED ORAL EVERY MORNING
Qty: 45 TABLET | Refills: 5 | Status: SHIPPED | OUTPATIENT
Start: 2021-11-17 | End: 2022-05-09

## 2021-12-14 ENCOUNTER — OFFICE VISIT (OUTPATIENT)
Dept: OBSTETRICS AND GYNECOLOGY | Facility: CLINIC | Age: 56
End: 2021-12-14
Payer: COMMERCIAL

## 2021-12-14 VITALS
WEIGHT: 226 LBS | SYSTOLIC BLOOD PRESSURE: 118 MMHG | HEIGHT: 65 IN | DIASTOLIC BLOOD PRESSURE: 78 MMHG | BODY MASS INDEX: 37.65 KG/M2

## 2021-12-14 DIAGNOSIS — Z12.4 SCREENING FOR CERVICAL CANCER: ICD-10-CM

## 2021-12-14 DIAGNOSIS — Z12.31 BREAST CANCER SCREENING BY MAMMOGRAM: ICD-10-CM

## 2021-12-14 DIAGNOSIS — Z01.419 ENCOUNTER FOR GYNECOLOGICAL EXAMINATION (GENERAL) (ROUTINE) WITHOUT ABNORMAL FINDINGS: Primary | ICD-10-CM

## 2021-12-14 DIAGNOSIS — Z11.51 SCREENING FOR HPV (HUMAN PAPILLOMAVIRUS): ICD-10-CM

## 2021-12-14 PROCEDURE — 99999 PR PBB SHADOW E&M-EST. PATIENT-LVL III: CPT | Mod: PBBFAC,,, | Performed by: OBSTETRICS & GYNECOLOGY

## 2021-12-14 PROCEDURE — 87624 HPV HI-RISK TYP POOLED RSLT: CPT | Performed by: OBSTETRICS & GYNECOLOGY

## 2021-12-14 PROCEDURE — 99386 PR PREVENTIVE VISIT,NEW,40-64: ICD-10-PCS | Mod: S$GLB,,, | Performed by: OBSTETRICS & GYNECOLOGY

## 2021-12-14 PROCEDURE — 99999 PR PBB SHADOW E&M-EST. PATIENT-LVL III: ICD-10-PCS | Mod: PBBFAC,,, | Performed by: OBSTETRICS & GYNECOLOGY

## 2021-12-14 PROCEDURE — 88175 CYTOPATH C/V AUTO FLUID REDO: CPT | Performed by: OBSTETRICS & GYNECOLOGY

## 2021-12-14 PROCEDURE — 99386 PREV VISIT NEW AGE 40-64: CPT | Mod: S$GLB,,, | Performed by: OBSTETRICS & GYNECOLOGY

## 2021-12-14 RX ORDER — METHOCARBAMOL 500 MG/1
500 TABLET, FILM COATED ORAL DAILY
COMMUNITY

## 2021-12-15 ENCOUNTER — HOSPITAL ENCOUNTER (OUTPATIENT)
Dept: RADIOLOGY | Facility: HOSPITAL | Age: 56
Discharge: HOME | End: 2021-12-15
Attending: NURSE PRACTITIONER
Payer: COMMERCIAL

## 2021-12-15 DIAGNOSIS — Z12.31 SCREENING MAMMOGRAM FOR BREAST CANCER: ICD-10-CM

## 2021-12-15 PROCEDURE — 77067 SCR MAMMO BI INCL CAD: CPT | Mod: 26,,, | Performed by: RADIOLOGY

## 2021-12-15 PROCEDURE — 77063 BREAST TOMOSYNTHESIS BI: CPT | Mod: 26,,, | Performed by: RADIOLOGY

## 2021-12-15 PROCEDURE — 77063 MAMMO DIGITAL SCREENING BILAT WITH TOMO: ICD-10-PCS | Mod: 26,,, | Performed by: RADIOLOGY

## 2021-12-15 PROCEDURE — 77067 SCR MAMMO BI INCL CAD: CPT | Mod: TC,PO

## 2021-12-15 PROCEDURE — 77067 MAMMO DIGITAL SCREENING BILAT WITH TOMO: ICD-10-PCS | Mod: 26,,, | Performed by: RADIOLOGY

## 2021-12-16 ENCOUNTER — TELEPHONE (OUTPATIENT)
Dept: RADIOLOGY | Facility: HOSPITAL | Age: 56
End: 2021-12-16
Payer: COMMERCIAL

## 2021-12-16 ENCOUNTER — PATIENT MESSAGE (OUTPATIENT)
Dept: RADIOLOGY | Facility: HOSPITAL | Age: 56
End: 2021-12-16
Payer: COMMERCIAL

## 2021-12-17 ENCOUNTER — TELEPHONE (OUTPATIENT)
Dept: RADIOLOGY | Facility: HOSPITAL | Age: 56
End: 2021-12-17
Payer: COMMERCIAL

## 2021-12-20 DIAGNOSIS — E78.2 MIXED HYPERLIPIDEMIA: Primary | ICD-10-CM

## 2021-12-20 RX ORDER — ROSUVASTATIN CALCIUM 10 MG/1
10 TABLET, COATED ORAL DAILY
Qty: 90 TABLET | Refills: 3 | Status: SHIPPED | OUTPATIENT
Start: 2021-12-20 | End: 2022-12-23 | Stop reason: SDUPTHER

## 2021-12-22 ENCOUNTER — HOSPITAL ENCOUNTER (OUTPATIENT)
Dept: RADIOLOGY | Facility: HOSPITAL | Age: 56
Discharge: HOME | End: 2021-12-22
Attending: NURSE PRACTITIONER
Payer: COMMERCIAL

## 2021-12-22 DIAGNOSIS — R92.8 ABNORMAL MAMMOGRAM OF RIGHT BREAST: ICD-10-CM

## 2021-12-22 PROCEDURE — 77065 MAMMO DIGITAL DIAGNOSTIC RIGHT WITH TOMO: ICD-10-PCS | Mod: 26,RT,, | Performed by: RADIOLOGY

## 2021-12-22 PROCEDURE — 77061 MAMMO DIGITAL DIAGNOSTIC RIGHT WITH TOMO: ICD-10-PCS | Mod: 26,RT,, | Performed by: RADIOLOGY

## 2021-12-22 PROCEDURE — 76642 US BREAST RIGHT LIMITED: ICD-10-PCS | Mod: 26,RT,, | Performed by: RADIOLOGY

## 2021-12-22 PROCEDURE — 77061 BREAST TOMOSYNTHESIS UNI: CPT | Mod: 26,RT,, | Performed by: RADIOLOGY

## 2021-12-22 PROCEDURE — 77061 BREAST TOMOSYNTHESIS UNI: CPT | Mod: TC,PO,RT

## 2021-12-22 PROCEDURE — 76642 ULTRASOUND BREAST LIMITED: CPT | Mod: TC,PO,RT

## 2021-12-22 PROCEDURE — 77065 DX MAMMO INCL CAD UNI: CPT | Mod: 26,RT,, | Performed by: RADIOLOGY

## 2021-12-22 PROCEDURE — 76642 ULTRASOUND BREAST LIMITED: CPT | Mod: 26,RT,, | Performed by: RADIOLOGY

## 2022-05-09 ENCOUNTER — PATIENT MESSAGE (OUTPATIENT)
Dept: SMOKING CESSATION | Facility: CLINIC | Age: 57
End: 2022-05-09
Payer: COMMERCIAL

## 2022-05-13 ENCOUNTER — TELEPHONE (OUTPATIENT)
Dept: FAMILY MEDICINE | Facility: CLINIC | Age: 57
End: 2022-05-13
Payer: COMMERCIAL

## 2022-05-13 NOTE — TELEPHONE ENCOUNTER
Called patient and left a voice message for patient to contact the office.  Provider is out on 5/18/22 and patient appointment need to be rescheduled

## 2022-06-14 DIAGNOSIS — G43.009 MIGRAINE WITHOUT AURA AND WITHOUT STATUS MIGRAINOSUS, NOT INTRACTABLE: ICD-10-CM

## 2022-06-15 RX ORDER — PROPRANOLOL HYDROCHLORIDE 80 MG/1
TABLET ORAL
Qty: 270 TABLET | Refills: 0 | Status: SHIPPED | OUTPATIENT
Start: 2022-06-15 | End: 2022-07-13

## 2022-06-15 NOTE — TELEPHONE ENCOUNTER
Call and make appt to be seen in next 30 days. She really needs to see Raffi. I did send refill to her pharmacy to last until her visit.

## 2022-07-09 DIAGNOSIS — G43.009 MIGRAINE WITHOUT AURA AND WITHOUT STATUS MIGRAINOSUS, NOT INTRACTABLE: ICD-10-CM

## 2022-07-13 RX ORDER — PROPRANOLOL HYDROCHLORIDE 80 MG/1
TABLET ORAL
Qty: 270 TABLET | Refills: 0 | Status: SHIPPED | OUTPATIENT
Start: 2022-07-13 | End: 2022-12-23 | Stop reason: SDUPTHER

## 2022-12-21 DIAGNOSIS — I10 HYPERTENSION, UNSPECIFIED TYPE: ICD-10-CM

## 2022-12-23 ENCOUNTER — OFFICE VISIT (OUTPATIENT)
Dept: FAMILY MEDICINE | Facility: CLINIC | Age: 57
End: 2022-12-23
Payer: COMMERCIAL

## 2022-12-23 VITALS
HEART RATE: 83 BPM | OXYGEN SATURATION: 95 % | WEIGHT: 230.81 LBS | SYSTOLIC BLOOD PRESSURE: 110 MMHG | HEIGHT: 65 IN | TEMPERATURE: 98 F | DIASTOLIC BLOOD PRESSURE: 74 MMHG | BODY MASS INDEX: 38.45 KG/M2

## 2022-12-23 DIAGNOSIS — Z00.00 ANNUAL PHYSICAL EXAM: Primary | ICD-10-CM

## 2022-12-23 DIAGNOSIS — G43.009 MIGRAINE WITHOUT AURA AND WITHOUT STATUS MIGRAINOSUS, NOT INTRACTABLE: ICD-10-CM

## 2022-12-23 DIAGNOSIS — E78.2 MIXED HYPERLIPIDEMIA: ICD-10-CM

## 2022-12-23 DIAGNOSIS — Z12.31 ENCOUNTER FOR SCREENING MAMMOGRAM FOR MALIGNANT NEOPLASM OF BREAST: ICD-10-CM

## 2022-12-23 DIAGNOSIS — F41.9 ANXIETY: ICD-10-CM

## 2022-12-23 PROCEDURE — 1159F PR MEDICATION LIST DOCUMENTED IN MEDICAL RECORD: ICD-10-PCS | Mod: CPTII,S$GLB,, | Performed by: NURSE PRACTITIONER

## 2022-12-23 PROCEDURE — 3074F SYST BP LT 130 MM HG: CPT | Mod: CPTII,S$GLB,, | Performed by: NURSE PRACTITIONER

## 2022-12-23 PROCEDURE — 3078F DIAST BP <80 MM HG: CPT | Mod: CPTII,S$GLB,, | Performed by: NURSE PRACTITIONER

## 2022-12-23 PROCEDURE — 3008F BODY MASS INDEX DOCD: CPT | Mod: CPTII,S$GLB,, | Performed by: NURSE PRACTITIONER

## 2022-12-23 PROCEDURE — 3008F PR BODY MASS INDEX (BMI) DOCUMENTED: ICD-10-PCS | Mod: CPTII,S$GLB,, | Performed by: NURSE PRACTITIONER

## 2022-12-23 PROCEDURE — 99396 PREV VISIT EST AGE 40-64: CPT | Mod: S$GLB,,, | Performed by: NURSE PRACTITIONER

## 2022-12-23 PROCEDURE — 99999 PR PBB SHADOW E&M-EST. PATIENT-LVL IV: CPT | Mod: PBBFAC,,, | Performed by: NURSE PRACTITIONER

## 2022-12-23 PROCEDURE — 3074F PR MOST RECENT SYSTOLIC BLOOD PRESSURE < 130 MM HG: ICD-10-PCS | Mod: CPTII,S$GLB,, | Performed by: NURSE PRACTITIONER

## 2022-12-23 PROCEDURE — 1159F MED LIST DOCD IN RCRD: CPT | Mod: CPTII,S$GLB,, | Performed by: NURSE PRACTITIONER

## 2022-12-23 PROCEDURE — 99999 PR PBB SHADOW E&M-EST. PATIENT-LVL IV: ICD-10-PCS | Mod: PBBFAC,,, | Performed by: NURSE PRACTITIONER

## 2022-12-23 PROCEDURE — 1160F RVW MEDS BY RX/DR IN RCRD: CPT | Mod: CPTII,S$GLB,, | Performed by: NURSE PRACTITIONER

## 2022-12-23 PROCEDURE — 3078F PR MOST RECENT DIASTOLIC BLOOD PRESSURE < 80 MM HG: ICD-10-PCS | Mod: CPTII,S$GLB,, | Performed by: NURSE PRACTITIONER

## 2022-12-23 PROCEDURE — 1160F PR REVIEW ALL MEDS BY PRESCRIBER/CLIN PHARMACIST DOCUMENTED: ICD-10-PCS | Mod: CPTII,S$GLB,, | Performed by: NURSE PRACTITIONER

## 2022-12-23 PROCEDURE — 99396 PR PREVENTIVE VISIT,EST,40-64: ICD-10-PCS | Mod: S$GLB,,, | Performed by: NURSE PRACTITIONER

## 2022-12-23 RX ORDER — ELETRIPTAN HYDROBROMIDE 40 MG/1
40 TABLET, FILM COATED ORAL
Qty: 15 TABLET | Refills: 2 | Status: SHIPPED | OUTPATIENT
Start: 2022-12-23 | End: 2024-02-28

## 2022-12-23 RX ORDER — SERTRALINE HYDROCHLORIDE 50 MG/1
TABLET, FILM COATED ORAL
Qty: 135 TABLET | Refills: 1 | Status: SHIPPED | OUTPATIENT
Start: 2022-12-23 | End: 2023-07-10

## 2022-12-23 RX ORDER — ROSUVASTATIN CALCIUM 10 MG/1
10 TABLET, COATED ORAL DAILY
Qty: 90 TABLET | Refills: 3 | Status: SHIPPED | OUTPATIENT
Start: 2022-12-23 | End: 2023-11-29 | Stop reason: SDUPTHER

## 2022-12-23 RX ORDER — PROPRANOLOL HYDROCHLORIDE 80 MG/1
TABLET ORAL
Qty: 270 TABLET | Refills: 1 | Status: SHIPPED | OUTPATIENT
Start: 2022-12-23 | End: 2023-07-10

## 2022-12-23 NOTE — PROGRESS NOTES
Subjective:       Patient ID: Rachel Boswell is a 57 y.o. female.    Chief Complaint: Annual Exam and Medication Refill  Last seen on 11/17/2021  HPI  Here for annual and need refills.  Vitals:    12/23/22 1118   BP: 110/74   Pulse: 83   Temp: 97.7 °F (36.5 °C)     Review of Systems    She states not getting much relief with her Robaxin and would like to get back on Soma. I have explained that it is controlled and will need to get from PCP (Raffi).   Objective:      Physical Exam  Vitals and nursing note reviewed.   Constitutional:       General: She is awake.      Appearance: Normal appearance. She is well-developed and well-groomed.   HENT:      Head: Normocephalic and atraumatic.      Right Ear: Hearing, ear canal and external ear normal. There is impacted cerumen.      Left Ear: Hearing, tympanic membrane, ear canal and external ear normal. There is no impacted cerumen.      Nose: Nose normal.      Mouth/Throat:      Lips: Pink.      Mouth: Mucous membranes are moist.   Eyes:      General: Lids are normal.         Right eye: No foreign body or discharge.         Left eye: No foreign body or discharge.   Cardiovascular:      Rate and Rhythm: Normal rate and regular rhythm.      Heart sounds: Normal heart sounds.   Pulmonary:      Effort: Pulmonary effort is normal.      Breath sounds: Normal breath sounds.   Musculoskeletal:         General: Normal range of motion.      Cervical back: Full passive range of motion without pain, normal range of motion and neck supple.      Right lower leg: No edema.      Left lower leg: No edema.   Lymphadenopathy:      Head:      Right side of head: No submental, submandibular, tonsillar, preauricular, posterior auricular or occipital adenopathy.      Left side of head: No submental, submandibular, tonsillar, preauricular, posterior auricular or occipital adenopathy.   Skin:     General: Skin is warm and dry.      Findings: No rash.   Neurological:      General: No focal  deficit present.      Mental Status: She is alert and oriented to person, place, and time.   Psychiatric:         Attention and Perception: Attention and perception normal.         Mood and Affect: Mood and affect normal.         Speech: Speech normal.         Behavior: Behavior normal. Behavior is cooperative.         Thought Content: Thought content normal.         Cognition and Memory: Cognition and memory normal.         Judgment: Judgment normal.       Assessment & Plan:       Annual physical exam  -     Lipid Panel; Future; Expected date: 12/23/2022  -     CBC Auto Differential; Future; Expected date: 12/23/2022  -     Comprehensive Metabolic Panel; Future; Expected date: 12/23/2022    Mixed hyperlipidemia- Currently not taking medication and I have discussed with her importance of taking it every day   -     Lipid Panel; Future; Expected date: 12/23/2022  -     rosuvastatin (CRESTOR) 10 MG tablet; Take 1 tablet (10 mg total) by mouth once daily.  Dispense: 90 tablet; Refill: 3    Anxiety  -     sertraline (ZOLOFT) 50 MG tablet; TAKE 1 AND 1/2 TABLETS BY MOUTH EVERY MORNING.  Dispense: 135 tablet; Refill: 1    Migraine without aura and without status migrainosus, not intractable  -     sertraline (ZOLOFT) 50 MG tablet; TAKE 1 AND 1/2 TABLETS BY MOUTH EVERY MORNING.  Dispense: 135 tablet; Refill: 1  -     propranoloL (INDERAL) 80 MG tablet; TAKE 1 TABLET(80 MG) BY MOUTH THREE TIMES DAILY  Dispense: 270 tablet; Refill: 1  -     eletriptan (RELPAX) 40 MG tablet; Take 1 tablet (40 mg total) by mouth as needed (headaches). may repeat in 2 hours if necessary  Dispense: 15 tablet; Refill: 2    Encounter for screening mammogram for malignant neoplasm of breast  -     Mammo Digital Screening Karol ruff/ Miguel A; Future; Expected date: 12/23/2023          Medication List with Changes/Refills   Current Medications    DIPHENHYDRAMINE (BENADRYL) 25 MG CAPSULE    Take 25 mg by mouth nightly as needed for Insomnia.     LEVOCETIRIZINE (XYZAL) 5 MG TABLET    Take 1 tablet (5 mg total) by mouth every evening. antihistamine    METHOCARBAMOL (ROBAXIN) 500 MG TAB    Take 500 mg by mouth once daily.    MUPIROCIN (BACTROBAN) 2 % OINTMENT    Apply topically 2 (two) times daily.    PROMETHAZINE (PHENERGAN) 12.5 MG TAB    Take 25 mg by mouth 2 (two) times daily as needed.   Changed and/or Refilled Medications    Modified Medication Previous Medication    ELETRIPTAN (RELPAX) 40 MG TABLET eletriptan (RELPAX) 40 MG tablet       Take 1 tablet (40 mg total) by mouth as needed (headaches). may repeat in 2 hours if necessary    Take 40 mg by mouth as needed. may repeat in 2 hours if necessary    PROPRANOLOL (INDERAL) 80 MG TABLET propranoloL (INDERAL) 80 MG tablet       TAKE 1 TABLET(80 MG) BY MOUTH THREE TIMES DAILY    TAKE 1 TABLET(80 MG) BY MOUTH THREE TIMES DAILY    ROSUVASTATIN (CRESTOR) 10 MG TABLET rosuvastatin (CRESTOR) 10 MG tablet       Take 1 tablet (10 mg total) by mouth once daily.    Take 1 tablet (10 mg total) by mouth once daily.    SERTRALINE (ZOLOFT) 50 MG TABLET sertraline (ZOLOFT) 50 MG tablet       TAKE 1 AND 1/2 TABLETS BY MOUTH EVERY MORNING.    TAKE 1 AND 1/2 TABLETS BY MOUTH EVERY MORNING.   Discontinued Medications    FLUTICASONE (FLONASE) 50 MCG/ACTUATION NASAL SPRAY    SHAKE LIQUID AND USE 1 SPRAY IN EACH NOSTRIL EVERY DAY      Follow up in about 1 year (around 12/23/2023), or if symptoms worsen or fail to improve.

## 2023-01-03 ENCOUNTER — DOCUMENTATION ONLY (OUTPATIENT)
Dept: FAMILY MEDICINE | Facility: CLINIC | Age: 58
End: 2023-01-03
Payer: COMMERCIAL

## 2023-01-03 NOTE — PROGRESS NOTES
Rachel Boswell Key: [id number] - PA Case ID: [id number]    Status  Sent to Plan today- denied    Drug  Eletriptan Hydrobromide 40MG tablets    Form  [employer] Electronic [place] Form

## 2023-01-04 ENCOUNTER — DOCUMENTATION ONLY (OUTPATIENT)
Dept: FAMILY MEDICINE | Facility: CLINIC | Age: 58
End: 2023-01-04
Payer: COMMERCIAL

## 2023-01-04 NOTE — PROGRESS NOTES
PA DENIED  Rachel Boswell Dillard: [id number] - [place] Case ID: [id number] help? Call us at [phone]  Outcome  Deniedon January 3  The drug you asked for is not listed in your preferred drug list (formulary). The preferred drug(s), you may not have tried are: sumatriptan tablet, naratriptan tablet OR rizatriptan disintegrating tablet. Your provider needs to give us medical reasons why the preferred drug(s) would not work for you and/or would have bad side effects. Sometimes a preferred drug needs more review for approval. Additionally, some preferred drugs listed may be the same drugs with different strengths or forms. [facility] may only require one strength or form of that drug to be tried. This decision was from [facility] Non-Formulary Exceptions Coverage Policy. Also, You asked for the drug above for 15 tablets per 30 days to treat your migraines. This is more than the allowed amount of 9 tablets per 30 days. Your provider can tell [facility] if there is a medical reason why you need more than the allowed amount. This decision was from [facility] Quantity Limitation Coverage Policy.  Drug  Eletriptan Hydrobromide 40MG tablets  Form  [employer] Electronic [place] Form

## 2023-01-13 ENCOUNTER — HOSPITAL ENCOUNTER (OUTPATIENT)
Dept: RADIOLOGY | Facility: HOSPITAL | Age: 58
Discharge: HOME | End: 2023-01-13
Attending: NURSE PRACTITIONER
Payer: COMMERCIAL

## 2023-01-13 DIAGNOSIS — Z12.31 ENCOUNTER FOR SCREENING MAMMOGRAM FOR MALIGNANT NEOPLASM OF BREAST: ICD-10-CM

## 2023-01-13 PROCEDURE — 77067 MAMMO DIGITAL SCREENING BILAT WITH TOMO: ICD-10-PCS | Mod: 26,,, | Performed by: RADIOLOGY

## 2023-01-13 PROCEDURE — 77063 BREAST TOMOSYNTHESIS BI: CPT | Mod: 26,,, | Performed by: RADIOLOGY

## 2023-01-13 PROCEDURE — 77063 MAMMO DIGITAL SCREENING BILAT WITH TOMO: ICD-10-PCS | Mod: 26,,, | Performed by: RADIOLOGY

## 2023-01-13 PROCEDURE — 77067 SCR MAMMO BI INCL CAD: CPT | Mod: 26,,, | Performed by: RADIOLOGY

## 2023-01-13 PROCEDURE — 77067 SCR MAMMO BI INCL CAD: CPT | Mod: TC,PO

## 2023-01-26 ENCOUNTER — TELEPHONE (OUTPATIENT)
Dept: FAMILY MEDICINE | Facility: CLINIC | Age: 58
End: 2023-01-26
Payer: COMMERCIAL

## 2023-01-26 NOTE — TELEPHONE ENCOUNTER
Please call and notify that I sent the following message over the portal that she did not read:    Good Evening,  You have had a very good improvement in total cholesterol and triglycerides. Good job!! All other labs within acceptable ranges as well.

## 2023-03-24 ENCOUNTER — OFFICE VISIT (OUTPATIENT)
Dept: OBSTETRICS AND GYNECOLOGY | Facility: CLINIC | Age: 58
End: 2023-03-24
Payer: COMMERCIAL

## 2023-03-24 VITALS
WEIGHT: 226.63 LBS | BODY MASS INDEX: 37.76 KG/M2 | HEIGHT: 65 IN | DIASTOLIC BLOOD PRESSURE: 76 MMHG | SYSTOLIC BLOOD PRESSURE: 124 MMHG

## 2023-03-24 DIAGNOSIS — Z01.419 ENCOUNTER FOR GYNECOLOGICAL EXAMINATION (GENERAL) (ROUTINE) WITHOUT ABNORMAL FINDINGS: Primary | ICD-10-CM

## 2023-03-24 PROCEDURE — 1159F PR MEDICATION LIST DOCUMENTED IN MEDICAL RECORD: ICD-10-PCS | Mod: CPTII,S$GLB,, | Performed by: OBSTETRICS & GYNECOLOGY

## 2023-03-24 PROCEDURE — 3078F DIAST BP <80 MM HG: CPT | Mod: CPTII,S$GLB,, | Performed by: OBSTETRICS & GYNECOLOGY

## 2023-03-24 PROCEDURE — 99999 PR PBB SHADOW E&M-EST. PATIENT-LVL III: ICD-10-PCS | Mod: PBBFAC,,, | Performed by: OBSTETRICS & GYNECOLOGY

## 2023-03-24 PROCEDURE — 99396 PR PREVENTIVE VISIT,EST,40-64: ICD-10-PCS | Mod: S$GLB,,, | Performed by: OBSTETRICS & GYNECOLOGY

## 2023-03-24 PROCEDURE — 1159F MED LIST DOCD IN RCRD: CPT | Mod: CPTII,S$GLB,, | Performed by: OBSTETRICS & GYNECOLOGY

## 2023-03-24 PROCEDURE — 3074F PR MOST RECENT SYSTOLIC BLOOD PRESSURE < 130 MM HG: ICD-10-PCS | Mod: CPTII,S$GLB,, | Performed by: OBSTETRICS & GYNECOLOGY

## 2023-03-24 PROCEDURE — 3008F BODY MASS INDEX DOCD: CPT | Mod: CPTII,S$GLB,, | Performed by: OBSTETRICS & GYNECOLOGY

## 2023-03-24 PROCEDURE — 99999 PR PBB SHADOW E&M-EST. PATIENT-LVL III: CPT | Mod: PBBFAC,,, | Performed by: OBSTETRICS & GYNECOLOGY

## 2023-03-24 PROCEDURE — 3078F PR MOST RECENT DIASTOLIC BLOOD PRESSURE < 80 MM HG: ICD-10-PCS | Mod: CPTII,S$GLB,, | Performed by: OBSTETRICS & GYNECOLOGY

## 2023-03-24 PROCEDURE — 3074F SYST BP LT 130 MM HG: CPT | Mod: CPTII,S$GLB,, | Performed by: OBSTETRICS & GYNECOLOGY

## 2023-03-24 PROCEDURE — 99396 PREV VISIT EST AGE 40-64: CPT | Mod: S$GLB,,, | Performed by: OBSTETRICS & GYNECOLOGY

## 2023-03-24 PROCEDURE — 3008F PR BODY MASS INDEX (BMI) DOCUMENTED: ICD-10-PCS | Mod: CPTII,S$GLB,, | Performed by: OBSTETRICS & GYNECOLOGY

## 2023-03-24 NOTE — PROGRESS NOTES
History and Physical:  Rachel Boswell is a 57 y.o. F who presents today for her routine annual GYN exam. The patient has no Gynecology complaints.    Annual:   No LMP recorded. Patient is postmenopausal.  Last Pap: 2021 NILM/HPV neg  History of abnormal pap:never  Last Mammogram: 2023 BIRADS 1; Tyrer-Cuzick risk 5.8%  Colonosocpy: 2017, repeat 8 years  PCP: RINA Nugent MD orders routine labs 2023  Immunization status: up to date and documented. Declined Zoste    Allergies:   Review of patient's allergies indicates:  No Known Allergies  Past Medical History:   Diagnosis Date    Anxiety     Gallstones 2016    Migraine headache      Past Surgical History:   Procedure Laterality Date    ovary removed Right      MEDS:   Current Outpatient Medications on File Prior to Visit   Medication Sig Dispense Refill    diphenhydrAMINE (BENADRYL) 25 mg capsule Take 25 mg by mouth nightly as needed for Insomnia.      eletriptan (RELPAX) 40 MG tablet Take 1 tablet (40 mg total) by mouth as needed (headaches). may repeat in 2 hours if necessary 15 tablet 2    propranoloL (INDERAL) 80 MG tablet TAKE 1 TABLET(80 MG) BY MOUTH THREE TIMES DAILY 270 tablet 1    rosuvastatin (CRESTOR) 10 MG tablet Take 1 tablet (10 mg total) by mouth once daily. 90 tablet 3    sertraline (ZOLOFT) 50 MG tablet TAKE 1 AND 1/2 TABLETS BY MOUTH EVERY MORNING. 135 tablet 1    levocetirizine (XYZAL) 5 MG tablet Take 1 tablet (5 mg total) by mouth every evening. antihistamine 30 tablet 2    methocarbamoL (ROBAXIN) 500 MG Tab Take 500 mg by mouth once daily.      mupirocin (BACTROBAN) 2 % ointment Apply topically 2 (two) times daily. (Patient not taking: Reported on 3/24/2023) 15 g 1    promethazine (PHENERGAN) 12.5 MG Tab Take 25 mg by mouth 2 (two) times daily as needed.       No current facility-administered medications on file prior to visit.     OB History          2    Para   2    Term   2            AB        Living              "SAB        IAB        Ectopic        Multiple        Live Births                   Social History     Socioeconomic History    Marital status:    Tobacco Use    Smoking status: Former     Types: Cigarettes     Quit date:      Years since quittin.2    Smokeless tobacco: Never   Substance and Sexual Activity    Alcohol use: Yes     Alcohol/week: 1.0 - 2.0 standard drink     Types: 1 - 2 Cans of beer per week     Comment: once month    Drug use: No    Sexual activity: Yes     Partners: Male     Family History   Problem Relation Age of Onset    Heart disease Father     COPD Father     Diabetes Mother     Breast cancer Neg Hx     Ovarian cancer Neg Hx        Past medical and surgical history reviewed.   I have reviewed the patient's medical history in detail and updated the computerized patient record.    Review of System:   General: no chills, fever, night sweats, weight gain or weight loss  Breasts: no new or changing breast lumps, nipple discharge or masses.  Gastrointestinal: no abdominal pain, change in bowel habits, or black or bloody stools  Genito-Urinary: no incontinence, urinary frequency/urgency or vulvar/vaginal symptoms, pelvic pain or abnormal vaginal bleeding.    Physical Exam:   /76   Ht 5' 5" (1.651 m)   Wt 102.8 kg (226 lb 10.1 oz)   BMI 37.71 kg/m²   Body mass index is 37.71 kg/m².  Constitutional: She appears alert and responsive. She appears well-developed, well-groomed, and well-nourished. No distress.  Breasts: are symmetrical.  Right breast exhibits no inverted nipple, no mass, no nipple discharge, no skin change and no tenderness.  Left breast exhibits no inverted nipple, no mass, no nipple discharge, no skin change and no tenderness.  Abdominal: Soft. She exhibits no distension, hernias or masses. There is no tenderness. No enlargement of liver edge or spleen.  There is no rebound and no guarding.   Genitourinary: Deferred. Pap not indicated and no GYN complaints. " Offered patient exam, patient declined exam.     Assessment/Plan:   Encounter for gynecological examination (general) (routine) without abnormal findings        Follow up in 1 year.

## 2023-07-10 DIAGNOSIS — F41.9 ANXIETY: ICD-10-CM

## 2023-07-10 DIAGNOSIS — G43.009 MIGRAINE WITHOUT AURA AND WITHOUT STATUS MIGRAINOSUS, NOT INTRACTABLE: ICD-10-CM

## 2023-07-10 RX ORDER — PROPRANOLOL HYDROCHLORIDE 80 MG/1
TABLET ORAL
Qty: 270 TABLET | Refills: 0 | Status: SHIPPED | OUTPATIENT
Start: 2023-07-10 | End: 2023-11-29 | Stop reason: SDUPTHER

## 2023-07-10 RX ORDER — SERTRALINE HYDROCHLORIDE 50 MG/1
TABLET, FILM COATED ORAL
Qty: 135 TABLET | Refills: 0 | Status: SHIPPED | OUTPATIENT
Start: 2023-07-10 | End: 2023-11-21

## 2023-08-05 ENCOUNTER — DOCUMENTATION ONLY (OUTPATIENT)
Dept: FAMILY MEDICINE | Facility: CLINIC | Age: 58
End: 2023-08-05
Payer: COMMERCIAL

## 2023-10-17 ENCOUNTER — DOCUMENTATION ONLY (OUTPATIENT)
Dept: FAMILY MEDICINE | Facility: CLINIC | Age: 58
End: 2023-10-17
Payer: COMMERCIAL

## 2023-11-20 ENCOUNTER — TELEPHONE (OUTPATIENT)
Dept: FAMILY MEDICINE | Facility: CLINIC | Age: 58
End: 2023-11-20
Payer: COMMERCIAL

## 2023-11-20 DIAGNOSIS — G43.009 MIGRAINE WITHOUT AURA AND WITHOUT STATUS MIGRAINOSUS, NOT INTRACTABLE: ICD-10-CM

## 2023-11-20 DIAGNOSIS — F41.9 ANXIETY: ICD-10-CM

## 2023-11-20 NOTE — TELEPHONE ENCOUNTER
Medication request.  Also sent a [email or web address] message to schedule an appointment to establish care with a provider.

## 2023-11-20 NOTE — TELEPHONE ENCOUNTER
----- Message from Jasmin Roman sent at 11/20/2023 12:19 PM CST -----  Regarding: Refill  Type:  RX Refill Request    Who Called: pt   Refill or New Rx:refill     RX Name and Strength:sertraline (ZOLOFT) 50 MG tablet  How is the patient currently taking it? (ex. 1XDay):as directed     Is this a 30 day or 90 day RX:90   Preferred Pharmacy with phone number:  [id number] - [place] [address] [employer] [place] [address] & [facility] [address] [place] [address] [facility] [address] [place] [id number]  Phone: [phone] Fax: [phone]       Local or Mail Order:local   Ordering Provider:hector Garcia Call Back Number:[phone] (home)   Additional Information: Requesting call back once sent to the pharmacy . Pt is out of rx . Pharmacy been seeing request and pt wants to know what the hold up is  please advise thank you

## 2023-11-21 RX ORDER — SERTRALINE HYDROCHLORIDE 50 MG/1
TABLET, FILM COATED ORAL
Qty: 135 TABLET | Refills: 0 | Status: SHIPPED | OUTPATIENT
Start: 2023-11-21 | End: 2023-11-29 | Stop reason: SDUPTHER

## 2023-11-21 NOTE — TELEPHONE ENCOUNTER
Pt has an upcoming appt with you on 11/29. Need a temporary refill on her sertraline. Please advise

## 2023-11-29 ENCOUNTER — OFFICE VISIT (OUTPATIENT)
Dept: FAMILY MEDICINE | Facility: CLINIC | Age: 58
End: 2023-11-29
Payer: COMMERCIAL

## 2023-11-29 VITALS
HEART RATE: 73 BPM | DIASTOLIC BLOOD PRESSURE: 80 MMHG | SYSTOLIC BLOOD PRESSURE: 116 MMHG | TEMPERATURE: 98 F | OXYGEN SATURATION: 96 % | HEIGHT: 65 IN | BODY MASS INDEX: 38.75 KG/M2 | WEIGHT: 232.56 LBS

## 2023-11-29 DIAGNOSIS — Z12.31 ENCOUNTER FOR SCREENING MAMMOGRAM FOR MALIGNANT NEOPLASM OF BREAST: ICD-10-CM

## 2023-11-29 DIAGNOSIS — Z00.00 ANNUAL PHYSICAL EXAM: Primary | ICD-10-CM

## 2023-11-29 DIAGNOSIS — E78.2 MIXED HYPERLIPIDEMIA: ICD-10-CM

## 2023-11-29 DIAGNOSIS — G43.009 MIGRAINE WITHOUT AURA AND WITHOUT STATUS MIGRAINOSUS, NOT INTRACTABLE: ICD-10-CM

## 2023-11-29 DIAGNOSIS — Z13.1 SCREENING FOR DIABETES MELLITUS (DM): ICD-10-CM

## 2023-11-29 DIAGNOSIS — F41.9 ANXIETY: ICD-10-CM

## 2023-11-29 PROCEDURE — 3074F PR MOST RECENT SYSTOLIC BLOOD PRESSURE < 130 MM HG: ICD-10-PCS | Mod: CPTII,S$GLB,, | Performed by: NURSE PRACTITIONER

## 2023-11-29 PROCEDURE — 3079F PR MOST RECENT DIASTOLIC BLOOD PRESSURE 80-89 MM HG: ICD-10-PCS | Mod: CPTII,S$GLB,, | Performed by: NURSE PRACTITIONER

## 2023-11-29 PROCEDURE — 99999 PR PBB SHADOW E&M-EST. PATIENT-LVL III: ICD-10-PCS | Mod: PBBFAC,,, | Performed by: NURSE PRACTITIONER

## 2023-11-29 PROCEDURE — 1159F PR MEDICATION LIST DOCUMENTED IN MEDICAL RECORD: ICD-10-PCS | Mod: CPTII,S$GLB,, | Performed by: NURSE PRACTITIONER

## 2023-11-29 PROCEDURE — 1160F RVW MEDS BY RX/DR IN RCRD: CPT | Mod: CPTII,S$GLB,, | Performed by: NURSE PRACTITIONER

## 2023-11-29 PROCEDURE — 1159F MED LIST DOCD IN RCRD: CPT | Mod: CPTII,S$GLB,, | Performed by: NURSE PRACTITIONER

## 2023-11-29 PROCEDURE — 99999 PR PBB SHADOW E&M-EST. PATIENT-LVL III: CPT | Mod: PBBFAC,,, | Performed by: NURSE PRACTITIONER

## 2023-11-29 PROCEDURE — 3008F BODY MASS INDEX DOCD: CPT | Mod: CPTII,S$GLB,, | Performed by: NURSE PRACTITIONER

## 2023-11-29 PROCEDURE — 3008F PR BODY MASS INDEX (BMI) DOCUMENTED: ICD-10-PCS | Mod: CPTII,S$GLB,, | Performed by: NURSE PRACTITIONER

## 2023-11-29 PROCEDURE — 99396 PR PREVENTIVE VISIT,EST,40-64: ICD-10-PCS | Mod: S$GLB,,, | Performed by: NURSE PRACTITIONER

## 2023-11-29 PROCEDURE — 1160F PR REVIEW ALL MEDS BY PRESCRIBER/CLIN PHARMACIST DOCUMENTED: ICD-10-PCS | Mod: CPTII,S$GLB,, | Performed by: NURSE PRACTITIONER

## 2023-11-29 PROCEDURE — 99396 PREV VISIT EST AGE 40-64: CPT | Mod: S$GLB,,, | Performed by: NURSE PRACTITIONER

## 2023-11-29 PROCEDURE — 3074F SYST BP LT 130 MM HG: CPT | Mod: CPTII,S$GLB,, | Performed by: NURSE PRACTITIONER

## 2023-11-29 PROCEDURE — 3079F DIAST BP 80-89 MM HG: CPT | Mod: CPTII,S$GLB,, | Performed by: NURSE PRACTITIONER

## 2023-11-29 RX ORDER — SERTRALINE HYDROCHLORIDE 50 MG/1
TABLET, FILM COATED ORAL
Qty: 135 TABLET | Refills: 3 | Status: SHIPPED | OUTPATIENT
Start: 2023-11-29

## 2023-11-29 RX ORDER — PROPRANOLOL HYDROCHLORIDE 80 MG/1
TABLET ORAL
Qty: 270 TABLET | Refills: 3 | Status: SHIPPED | OUTPATIENT
Start: 2023-11-29

## 2023-11-29 RX ORDER — ROSUVASTATIN CALCIUM 10 MG/1
10 TABLET, COATED ORAL DAILY
Qty: 90 TABLET | Refills: 3 | Status: SHIPPED | OUTPATIENT
Start: 2023-11-29 | End: 2024-11-28

## 2023-11-29 NOTE — PROGRESS NOTES
Subjective:       Patient ID: Rachel Boswell is a 57 y.o. female.    Chief Complaint: Annual Exam  She was last seen in primary cared on 12/23/2022  HPI    Vitals:    11/29/23 0747   BP: 116/80   Pulse: 73   Temp: 97.9 °F (36.6 °C)     Review of Systems   Constitutional:  Negative for fatigue.   Respiratory:  Negative for shortness of breath.    Cardiovascular:  Negative for chest pain.   Neurological:  Negative for dizziness.       Objective:      Physical Exam  Vitals and nursing note reviewed.   Constitutional:       General: She is awake.      Appearance: Normal appearance. She is well-developed, well-groomed and overweight.   HENT:      Head: Normocephalic and atraumatic.      Right Ear: Hearing, tympanic membrane, ear canal and external ear normal.      Left Ear: Hearing, tympanic membrane, ear canal and external ear normal.      Nose: Nose normal.      Mouth/Throat:      Lips: Pink.      Mouth: Mucous membranes are moist.      Pharynx: Oropharynx is clear.   Eyes:      General: Lids are normal.   Cardiovascular:      Rate and Rhythm: Normal rate and regular rhythm.      Heart sounds: Normal heart sounds.   Pulmonary:      Effort: Pulmonary effort is normal.      Breath sounds: Normal breath sounds and air entry.   Abdominal:      General: Abdomen is protuberant.      Palpations: Abdomen is soft.   Musculoskeletal:      Cervical back: Full passive range of motion without pain, normal range of motion and neck supple.      Right lower leg: No edema.      Left lower leg: No edema.   Skin:     General: Skin is warm and dry.      Findings: No rash.   Neurological:      General: No focal deficit present.      Mental Status: She is alert and oriented to person, place, and time.   Psychiatric:         Attention and Perception: Attention and perception normal.         Mood and Affect: Mood and affect normal.         Speech: Speech normal.         Behavior: Behavior normal. Behavior is cooperative.         Thought  Content: Thought content normal.         Cognition and Memory: Cognition and memory normal.         Judgment: Judgment normal.         Assessment & Plan:       Annual physical exam  -     Lipid Panel; Future; Expected date: 11/29/2023  -     Comprehensive Metabolic Panel; Future; Expected date: 11/29/2023    Anxiety  -     sertraline (ZOLOFT) 50 MG tablet; Take one and half tablet every morning  Dispense: 135 tablet; Refill: 3    Mixed hyperlipidemia  -     rosuvastatin (CRESTOR) 10 MG tablet; Take 1 tablet (10 mg total) by mouth once daily.  Dispense: 90 tablet; Refill: 3    Migraine without aura and without status migrainosus, not intractable  -     propranoloL (INDERAL) 80 MG tablet; Take 3 times daily  Dispense: 270 tablet; Refill: 3  -     sertraline (ZOLOFT) 50 MG tablet; Take one and half tablet every morning  Dispense: 135 tablet; Refill: 3    Screening for diabetes mellitus (DM)  -     Hemoglobin A1C; Future; Expected date: 11/29/2023    Encounter for screening mammogram for malignant neoplasm of breast  -     Mammo Digital Screening Bilat w/ Miguel A; Future; Expected date: 01/15/2024          Medication List with Changes/Refills   Current Medications    DIPHENHYDRAMINE (BENADRYL) 25 MG CAPSULE    Take 25 mg by mouth nightly as needed for Insomnia.    ELETRIPTAN (RELPAX) 40 MG TABLET    Take 1 tablet (40 mg total) by mouth as needed (headaches). may repeat in 2 hours if necessary    METHOCARBAMOL (ROBAXIN) 500 MG TAB    Take 500 mg by mouth once daily.   Changed and/or Refilled Medications    Modified Medication Previous Medication    PROPRANOLOL (INDERAL) 80 MG TABLET propranoloL (INDERAL) 80 MG tablet       Take 3 times daily    TAKE 1 TABLET(80 MG) BY MOUTH THREE TIMES DAILY    ROSUVASTATIN (CRESTOR) 10 MG TABLET rosuvastatin (CRESTOR) 10 MG tablet       Take 1 tablet (10 mg total) by mouth once daily.    Take 1 tablet (10 mg total) by mouth once daily.    SERTRALINE (ZOLOFT) 50 MG TABLET sertraline  (ZOLOFT) 50 MG tablet       Take one and half tablet every morning    TAKE 1 AND 1/2 TABLETS BY MOUTH EVERY MORNING   Discontinued Medications    LEVOCETIRIZINE (XYZAL) 5 MG TABLET    Take 1 tablet (5 mg total) by mouth every evening. antihistamine    MUPIROCIN (BACTROBAN) 2 % OINTMENT    Apply topically 2 (two) times daily.    PROMETHAZINE (PHENERGAN) 12.5 MG TAB    Take 25 mg by mouth 2 (two) times daily as needed.      Follow up in about 1 year (around 11/29/2024), or if symptoms worsen or fail to improve.

## 2024-02-27 ENCOUNTER — HOSPITAL ENCOUNTER (OUTPATIENT)
Dept: RADIOLOGY | Facility: HOSPITAL | Age: 59
Discharge: HOME | End: 2024-02-27
Attending: NURSE PRACTITIONER
Payer: COMMERCIAL

## 2024-02-27 DIAGNOSIS — Z12.31 ENCOUNTER FOR SCREENING MAMMOGRAM FOR MALIGNANT NEOPLASM OF BREAST: ICD-10-CM

## 2024-02-27 DIAGNOSIS — G43.009 MIGRAINE WITHOUT AURA AND WITHOUT STATUS MIGRAINOSUS, NOT INTRACTABLE: ICD-10-CM

## 2024-02-27 PROCEDURE — 77067 SCR MAMMO BI INCL CAD: CPT | Mod: TC,PO

## 2024-02-27 PROCEDURE — 77063 BREAST TOMOSYNTHESIS BI: CPT | Mod: 26,,, | Performed by: RADIOLOGY

## 2024-02-27 PROCEDURE — 77067 SCR MAMMO BI INCL CAD: CPT | Mod: 26,,, | Performed by: RADIOLOGY

## 2024-02-28 DIAGNOSIS — F41.9 ANXIETY: ICD-10-CM

## 2024-02-28 DIAGNOSIS — G43.009 MIGRAINE WITHOUT AURA AND WITHOUT STATUS MIGRAINOSUS, NOT INTRACTABLE: ICD-10-CM

## 2024-02-28 RX ORDER — SERTRALINE HYDROCHLORIDE 50 MG/1
TABLET, FILM COATED ORAL
Qty: 135 TABLET | Refills: 3 | OUTPATIENT
Start: 2024-02-28

## 2024-02-28 RX ORDER — ELETRIPTAN HYDROBROMIDE 40 MG/1
TABLET, FILM COATED ORAL
Qty: 15 TABLET | Refills: 2 | Status: SHIPPED | OUTPATIENT
Start: 2024-02-28

## 2024-03-22 ENCOUNTER — TELEPHONE (OUTPATIENT)
Dept: FAMILY MEDICINE | Facility: CLINIC | Age: 59
End: 2024-03-22
Payer: COMMERCIAL

## 2024-03-22 NOTE — TELEPHONE ENCOUNTER
----- Message from Temitope Ayers DNP, APRN sent at 3/20/2024  6:02 PM CDT -----  Please notify of unread message sent via portal on 02/29/24:  Good evening,  As you have seen your HGBA1c was normal and that is good, no diabetes. Total cholesterol has gone up slightly and below is some information that may help you get it back down. Be sure to take your Crestor every day.     Health Maintenance:  Controlling Cholesterol  Cholesterol is a naturally occurring fatty substance.  It has both good and bad effects on the body.  On the good side it builds natural hormones, and helps build and maintain nerve cells.  However, when your body has too much cholesterol, blood vessel walls can thicken and reduce circulation causing heart attacks and strokes.    Most of the cholesterol in your blood is made by your liver from the fats, carbohydrates, and proteins you eat.  Cholesterol also comes directly from foods (animal products only) that you eat.

## 2024-04-30 ENCOUNTER — OFFICE VISIT (OUTPATIENT)
Dept: OBSTETRICS AND GYNECOLOGY | Facility: CLINIC | Age: 59
End: 2024-04-30
Payer: COMMERCIAL

## 2024-04-30 VITALS
HEIGHT: 65 IN | SYSTOLIC BLOOD PRESSURE: 106 MMHG | WEIGHT: 237.88 LBS | DIASTOLIC BLOOD PRESSURE: 70 MMHG | BODY MASS INDEX: 39.63 KG/M2

## 2024-04-30 DIAGNOSIS — Z01.419 ENCOUNTER FOR GYNECOLOGICAL EXAMINATION (GENERAL) (ROUTINE) WITHOUT ABNORMAL FINDINGS: Primary | ICD-10-CM

## 2024-04-30 DIAGNOSIS — E66.9 OBESITY (BMI 30-39.9): ICD-10-CM

## 2024-04-30 PROCEDURE — 3044F HG A1C LEVEL LT 7.0%: CPT | Mod: CPTII,S$GLB,, | Performed by: OBSTETRICS & GYNECOLOGY

## 2024-04-30 PROCEDURE — 99396 PREV VISIT EST AGE 40-64: CPT | Mod: 25,S$GLB,, | Performed by: OBSTETRICS & GYNECOLOGY

## 2024-04-30 PROCEDURE — 3008F BODY MASS INDEX DOCD: CPT | Mod: CPTII,S$GLB,, | Performed by: OBSTETRICS & GYNECOLOGY

## 2024-04-30 PROCEDURE — 1159F MED LIST DOCD IN RCRD: CPT | Mod: CPTII,S$GLB,, | Performed by: OBSTETRICS & GYNECOLOGY

## 2024-04-30 PROCEDURE — 3078F DIAST BP <80 MM HG: CPT | Mod: CPTII,S$GLB,, | Performed by: OBSTETRICS & GYNECOLOGY

## 2024-04-30 PROCEDURE — 3074F SYST BP LT 130 MM HG: CPT | Mod: CPTII,S$GLB,, | Performed by: OBSTETRICS & GYNECOLOGY

## 2024-04-30 PROCEDURE — 99999 PR PBB SHADOW E&M-EST. PATIENT-LVL III: CPT | Mod: PBBFAC,,, | Performed by: OBSTETRICS & GYNECOLOGY

## 2024-04-30 PROCEDURE — 99213 OFFICE O/P EST LOW 20 MIN: CPT | Mod: 25,S$GLB,, | Performed by: OBSTETRICS & GYNECOLOGY

## 2024-04-30 NOTE — PROGRESS NOTES
Chief Complaint   Patient presents with    Well Woman       History and Physical:  Rachel Boswell is a 58 y.o. F who presents today for her routine annual GYN exam. The patient has Gynecology complaints: weight loss issues    Annual:   No LMP recorded. Patient is postmenopausal.  Last Pap: 2021 NILM/HPV neg; next 2026  History of abnormal pap:never  Last Mammogram: 2024 BIRADS 1; Tyrer-Cuzick risk 5%; next 2025  Colonosocpy: 2017, repeat 8 years; next 2025  PCP: BEATRIZ Nugent MD orders routine labs 2024   Immunization status: up to date and documented. Declined Zoster    Allergies:   Review of patient's allergies indicates:  No Known Allergies  Past Medical History:   Diagnosis Date    Anxiety     Gallstones 2016    Migraine headache      Past Surgical History:   Procedure Laterality Date    ovary removed Right      MEDS:   Current Outpatient Medications on File Prior to Visit   Medication Sig Dispense Refill    diphenhydrAMINE (BENADRYL) 25 mg capsule Take 25 mg by mouth nightly as needed for Insomnia.      propranoloL (INDERAL) 80 MG tablet Take 3 times daily 270 tablet 3    rosuvastatin (CRESTOR) 10 MG tablet Take 1 tablet (10 mg total) by mouth once daily. 90 tablet 3    sertraline (ZOLOFT) 50 MG tablet Take one and half tablet every morning 135 tablet 3    eletriptan (RELPAX) 40 MG tablet TAKE 1 TABLET BY MOUTH AS NEEDED FOR HEADACHE; MAY REPEAT IN 2 HOURS IF NECESSARY (Patient not taking: Reported on 2024) 15 tablet 2    methocarbamoL (ROBAXIN) 500 MG Tab Take 500 mg by mouth once daily. (Patient not taking: Reported on 2024)       No current facility-administered medications on file prior to visit.     OB History          2    Para   2    Term   2            AB        Living             SAB        IAB        Ectopic        Multiple        Live Births                   Social History     Socioeconomic History    Marital status:    Tobacco Use    Smoking  "status: Former     Current packs/day: 0.00     Types: Cigarettes     Quit date:      Years since quittin.3    Smokeless tobacco: Never   Substance and Sexual Activity    Alcohol use: Yes     Alcohol/week: 1.0 - 2.0 standard drink of alcohol     Types: 1 - 2 Cans of beer per week     Comment: once month    Drug use: No    Sexual activity: Yes     Partners: Male     Family History   Problem Relation Name Age of Onset    Heart disease Father      COPD Father      Diabetes Mother      Breast cancer Neg Hx      Ovarian cancer Neg Hx         Past medical and surgical history reviewed.   I have reviewed the patient's medical history in detail and updated the computerized patient record.    Review of System:   General: no chills, fever, night sweats, weight gain or weight loss  Breasts: no new or changing breast lumps, nipple discharge or masses.  Gastrointestinal: no abdominal pain, change in bowel habits, or black or bloody stools  Genito-Urinary: no incontinence, urinary frequency/urgency or vulvar/vaginal symptoms, pelvic pain or abnormal vaginal bleeding.    Physical Exam:   /70   Ht 5' 5" (1.651 m)   Wt 107.9 kg (237 lb 14 oz)   BMI 39.58 kg/m²   Body mass index is 39.58 kg/m².  Constitutional: She appears alert and responsive. She appears well-developed, well-groomed, and well-nourished. No distress.  Breasts: are symmetrical.  Right breast exhibits no inverted nipple, no mass, no nipple discharge, no skin change and no tenderness.  Left breast exhibits no inverted nipple, no mass, no nipple discharge, no skin change and no tenderness.  Abdominal: Soft. She exhibits no distension, hernias or masses. There is no tenderness. No enlargement of liver edge or spleen.  There is no rebound and no guarding.   Genitourinary: Deferred. Pap not indicated and no GYN complaints. Offered patient exam, patient declined exam.     Assessment/Plan:   Encounter for gynecological examination (general) (routine) without " abnormal findings    Obesity (BMI 30-39.9)      Obesity, Class II BMI 35-39.9: Lifestyle modification & exercise. Discussed weight loss medication.    I recommend the following therapeutic lifestyle changes:  Discussed Diet & Exercise.     Recommended low calorie diet (20-25kcal/kg) & consider food syl to track diet/calories.  Transition to a low salt, primarily plant-based diet which emphasizes:  Increase fiber with vegetables, whole grains, legumes   Increase lean protein by eating beans, legumes, fish, poultry, eggs, bison  Good dairy choices for lean protein include greek yogurt (low sugar options) and cottage cheese  Replacing butter with healthy fats, such as olive oil and nuts  Using herbs and spices instead of salt to flavor foods   Limiting red meat to no more than a few times a month   Limit added sugars (sodas, fruit juices, fancy coffee drinks)  Limit processed foods    Recommended physical exercise 30 min per day x 5 days a week & consider syl to track activity. Taking the stairs instead of elevator or parking at the back of the parking lot can increase physical activity.    Discussed weight loss mediation       Follow up in 1 year.

## 2024-05-31 ENCOUNTER — OFFICE VISIT (OUTPATIENT)
Dept: FAMILY MEDICINE | Facility: CLINIC | Age: 59
End: 2024-05-31
Payer: COMMERCIAL

## 2024-05-31 VITALS
HEART RATE: 79 BPM | HEIGHT: 65 IN | BODY MASS INDEX: 39.71 KG/M2 | DIASTOLIC BLOOD PRESSURE: 82 MMHG | SYSTOLIC BLOOD PRESSURE: 140 MMHG | WEIGHT: 238.31 LBS | OXYGEN SATURATION: 97 %

## 2024-05-31 DIAGNOSIS — G43.909 MIGRAINE WITHOUT STATUS MIGRAINOSUS, NOT INTRACTABLE, UNSPECIFIED MIGRAINE TYPE: ICD-10-CM

## 2024-05-31 DIAGNOSIS — Z71.3 DIETARY COUNSELING AND SURVEILLANCE: Primary | ICD-10-CM

## 2024-05-31 DIAGNOSIS — E66.9 CLASS 2 OBESITY WITH BODY MASS INDEX (BMI) OF 39.0 TO 39.9 IN ADULT, UNSPECIFIED OBESITY TYPE, UNSPECIFIED WHETHER SERIOUS COMORBIDITY PRESENT: ICD-10-CM

## 2024-05-31 PROBLEM — Z12.11 COLON CANCER SCREENING: Status: RESOLVED | Noted: 2017-08-17 | Resolved: 2024-05-31

## 2024-05-31 PROCEDURE — 3044F HG A1C LEVEL LT 7.0%: CPT | Mod: CPTII,S$GLB,, | Performed by: FAMILY MEDICINE

## 2024-05-31 PROCEDURE — 3077F SYST BP >= 140 MM HG: CPT | Mod: CPTII,S$GLB,, | Performed by: FAMILY MEDICINE

## 2024-05-31 PROCEDURE — 1159F MED LIST DOCD IN RCRD: CPT | Mod: CPTII,S$GLB,, | Performed by: FAMILY MEDICINE

## 2024-05-31 PROCEDURE — 3008F BODY MASS INDEX DOCD: CPT | Mod: CPTII,S$GLB,, | Performed by: FAMILY MEDICINE

## 2024-05-31 PROCEDURE — 99214 OFFICE O/P EST MOD 30 MIN: CPT | Mod: S$GLB,,, | Performed by: FAMILY MEDICINE

## 2024-05-31 PROCEDURE — 99999 PR PBB SHADOW E&M-EST. PATIENT-LVL III: CPT | Mod: PBBFAC,,, | Performed by: FAMILY MEDICINE

## 2024-05-31 PROCEDURE — 3079F DIAST BP 80-89 MM HG: CPT | Mod: CPTII,S$GLB,, | Performed by: FAMILY MEDICINE

## 2024-05-31 PROCEDURE — G2211 COMPLEX E/M VISIT ADD ON: HCPCS | Mod: S$GLB,,, | Performed by: FAMILY MEDICINE

## 2024-05-31 NOTE — PROGRESS NOTES
Subjective:       Patient ID: Rachel Boswell is a 58 y.o. female.    Chief Complaint: Follow-up (6 month follow up)    Here for follow up multiple chronic medical issues. Doing well overall and in normal state of health.  Interested in wt loss medication.    Follow-up  Pertinent negatives include no chest pain, chills, coughing or fever.     Review of Systems   Constitutional:  Negative for chills and fever.   Respiratory:  Negative for cough, chest tightness and shortness of breath.    Cardiovascular:  Negative for chest pain, palpitations and leg swelling.   Endocrine: Negative for cold intolerance and heat intolerance.   Psychiatric/Behavioral:  Negative for decreased concentration. The patient is not nervous/anxious.        Objective:      Physical Exam  Vitals and nursing note reviewed.   Constitutional:       Appearance: She is well-developed.   HENT:      Head: Normocephalic and atraumatic.   Cardiovascular:      Rate and Rhythm: Normal rate and regular rhythm.      Heart sounds: Normal heart sounds.   Pulmonary:      Effort: Pulmonary effort is normal.      Breath sounds: Normal breath sounds.   Psychiatric:         Mood and Affect: Mood normal.         Behavior: Behavior normal.         Assessment:       1. Dietary counseling and surveillance    2. Migraine without status migrainosus, not intractable, unspecified migraine type    3. Class 2 obesity with body mass index (BMI) of 39.0 to 39.9 in adult, unspecified obesity type, unspecified whether serious comorbidity present        Plan:       Dietary counseling and surveillance    Migraine without status migrainosus, not intractable, unspecified migraine type    Class 2 obesity with body mass index (BMI) of 39.0 to 39.9 in adult, unspecified obesity type, unspecified whether serious comorbidity present  -     naltrexone-bupropion (CONTRAVE) 8-90 mg TbSR; Take 1 tablet by mouth 2 (two) times daily.  Dispense: 60 tablet; Refill: 2        Diet/exercise and wt  loss monitor  Would like to try contrave, discussed glp1 med  Visit today included increased complexity associated with the care of the episodic problem migraine addressed and managing the longitudinal care of the patient due to the serious and/or complex managed problem(s) as above.  Will monitor chronic medical issues and continue current plan of care.    Follow up in about 3 months (around 8/31/2024), or if symptoms worsen or fail to improve.

## 2024-09-19 DIAGNOSIS — E66.9 CLASS 2 OBESITY WITH BODY MASS INDEX (BMI) OF 39.0 TO 39.9 IN ADULT, UNSPECIFIED OBESITY TYPE, UNSPECIFIED WHETHER SERIOUS COMORBIDITY PRESENT: ICD-10-CM

## 2024-09-19 NOTE — TELEPHONE ENCOUNTER
No care due was identified.  [facility] Embedded Care Due Messages. Reference number: [id number].   9/19/2024 3:15:21 PM CDT

## 2024-10-01 ENCOUNTER — TELEPHONE (OUTPATIENT)
Dept: FAMILY MEDICINE | Facility: CLINIC | Age: 59
End: 2024-10-01
Payer: COMMERCIAL

## 2024-10-03 ENCOUNTER — TELEPHONE (OUTPATIENT)
Dept: FAMILY MEDICINE | Facility: CLINIC | Age: 59
End: 2024-10-03
Payer: COMMERCIAL

## 2024-10-03 NOTE — TELEPHONE ENCOUNTER
----- Message from Sarai sent at 10/3/2024  9:28 AM CDT -----  Type:  Pharmacy Calling to Clarify an RX    Name of Caller:Benito  Pharmacy Name:[facility]- [place] - [address]  Prescription Name:naltrexone-bupropion (CONTRAVE) 8-90 mg TbSR  What do they need to clarify?:Pt is requesting refills on med  Best Call Back Number:[phone] fax [phone]  Additional Information:

## 2024-10-11 ENCOUNTER — TELEPHONE (OUTPATIENT)
Dept: NEUROLOGY | Facility: CLINIC | Age: 59
End: 2024-10-11
Payer: COMMERCIAL

## 2024-10-11 NOTE — TELEPHONE ENCOUNTER
Pt seen in ER today for sudden onset confusion, has hx of migraine. Pt reports imaging was normal, Er Navigator requested appt with neurology. Appt scheduled with Nigel Laura.

## 2024-10-15 ENCOUNTER — OFFICE VISIT (OUTPATIENT)
Dept: NEUROLOGY | Facility: CLINIC | Age: 59
End: 2024-10-15
Payer: COMMERCIAL

## 2024-10-15 VITALS
SYSTOLIC BLOOD PRESSURE: 151 MMHG | TEMPERATURE: 97 F | RESPIRATION RATE: 17 BRPM | DIASTOLIC BLOOD PRESSURE: 78 MMHG | WEIGHT: 228.19 LBS | HEART RATE: 67 BPM | HEIGHT: 65 IN | BODY MASS INDEX: 38.02 KG/M2

## 2024-10-15 DIAGNOSIS — R29.818 TRANSIENT NEUROLOGICAL SYMPTOMS: Primary | ICD-10-CM

## 2024-10-15 PROCEDURE — 3077F SYST BP >= 140 MM HG: CPT | Mod: CPTII,S$GLB,, | Performed by: PHYSICIAN ASSISTANT

## 2024-10-15 PROCEDURE — 1160F RVW MEDS BY RX/DR IN RCRD: CPT | Mod: CPTII,S$GLB,, | Performed by: PHYSICIAN ASSISTANT

## 2024-10-15 PROCEDURE — 3008F BODY MASS INDEX DOCD: CPT | Mod: CPTII,S$GLB,, | Performed by: PHYSICIAN ASSISTANT

## 2024-10-15 PROCEDURE — 1159F MED LIST DOCD IN RCRD: CPT | Mod: CPTII,S$GLB,, | Performed by: PHYSICIAN ASSISTANT

## 2024-10-15 PROCEDURE — 3044F HG A1C LEVEL LT 7.0%: CPT | Mod: CPTII,S$GLB,, | Performed by: PHYSICIAN ASSISTANT

## 2024-10-15 PROCEDURE — 99999 PR PBB SHADOW E&M-EST. PATIENT-LVL IV: CPT | Mod: PBBFAC,,, | Performed by: PHYSICIAN ASSISTANT

## 2024-10-15 PROCEDURE — 3078F DIAST BP <80 MM HG: CPT | Mod: CPTII,S$GLB,, | Performed by: PHYSICIAN ASSISTANT

## 2024-10-15 PROCEDURE — 99205 OFFICE O/P NEW HI 60 MIN: CPT | Mod: S$GLB,,, | Performed by: PHYSICIAN ASSISTANT

## 2024-10-15 RX ORDER — NAPROXEN SODIUM 220 MG/1
81 TABLET, FILM COATED ORAL DAILY
COMMUNITY

## 2024-10-15 NOTE — PATIENT INSTRUCTIONS
If signs of stroke (e.g., worst headache of life, sudden vision change, numbness/weakness one side of the body, facial droop, speech change or confusion), call 911.

## 2024-10-15 NOTE — PROGRESS NOTES
[facility]  [address] [facility] [address]  [place] [address]  Phone:[phone]  Fax: [phone]   New Patient Consultation    Patient Name: Rachel Boswell  : 1965  MRN:  [id number]  Today: 10/15/2024   chief complaint: Headache    PCP: BEATRIZ Nugent MD.       Assessment/plan:   Rachel Boswell is a 58 y.o. right handed female with a PMHx of: anxiety, migraines and HLD,    whom presents with her  in post ED follow up.  Appears to have transient confusion with no cephalgia. No other stroke/seizure like symptom or history there of. She is on daily ASA (noted some fibromuscular dysplasia found on CTA head/neck ). Head CT was negative. She is on statin (suggested taking at night as opposed to morning). Noted some elevated BP, trouble in sleep (taking benadryl nightly) with snoring and apparent confusion, possibly an untreated sleep apnea?  Also states that morning minimal PO water intake and has been under stress. Possible trigger for transient event? Doubt confusional migraine (no HA/migraine in many years and did not have cephalgia with episode)     Conservatively- gave her a list of AHS triggers for HA (in AVS) and discussed CVA symptoms at length, if present, call 911                          -stay well hydrated                             -will have  monitor her for snoring episodes, and when she follows up with PCP if there remains concern may benefit from sleep study (will defer to PCP), also may benefit from alternative sleep aide  (I.e., d/t potential for REM rebound)                                                     -offered imaging study, this was declined             Review:    ICD-10-CM ICD-9-CM   1. Transient neurological symptoms  R29.818 781.99      All test results as well as any necessary instructions were reviewed and discussed with patient.         Patient to return to PCP/other specialists for all other  "problems  Patient to continue on all medications as Rx'd   A detailed AVS was provided to the patient with patient readback   RTO- PRN   The patient indicates understanding of these issues and agrees to the plan.    HPI:   Rachel Boswell is a 58 y.o.right handed, female with a PMHx of: anxiety, migraines and HLD,    whom presents with her  in post ED follow up.        Recently went to work had difficulty clocking in/forgetting password---acute confusion (last Friday), no migraine during episode, lasted a few hours, went to ER for check up  -no vision changes, no slurred speech, no LOC  -no palpitations/no SOB  -slept well night before, episodic snoring when asked ()         -uses benadryl every night to sleep, mood poor and memory is "just okay" when asked   -taking contrave (3 months), was out of it for 4 weeks, then restarted it 2 days prior to episode  -no foreign travel/no trauma/no exposures/no recent illnesses   -when asked, well hydrated but usually drinks water later in day, usually cup of coffee in the morning   -no stroke like symptoms  -no hx of seizure  -no CAD/MI symptoms, denies palpitations or SOB   No episode since   When asked, has been under significant stress   No migraines since 2021, prior hx of frequent migraines, triptan abortive, found switching water supply to house has improved her HA   History of trauma (denies), History of CNS infection (denies), History of Stroke (denies)  Positives in bold: Hx of Kidney Stones, asthma, GI bleed, osteoporosis, CAD/MI, CVA/TIA, DM  <---denies   Imaging on file: 10/11/2024 CT Head and 10/11/2024 CTA Head/neck (fibromuscular dysplasia)   Therapies tried in past for migraines: (failures to be marked, if known---why did it fail?)  Inderal  Zoloft  Phenergan  Zofran  Robaxin  Vistaril  Relpax  Soma  Hydrocodone-acetaminophen       Medication Reconciliation:   Current Outpatient Medications   Medication Sig Dispense Refill    " diphenhydrAMINE (BENADRYL) 25 mg capsule Take 25 mg by mouth nightly as needed for Insomnia.      eletriptan (RELPAX) 40 MG tablet TAKE 1 TABLET BY MOUTH AS NEEDED FOR HEADACHE; MAY REPEAT IN 2 HOURS IF NECESSARY 15 tablet 2    naltrexone-bupropion (CONTRAVE) 8-90 mg TbSR Take 1 tablet by mouth 2 (two) times daily. 60 tablet 2    propranoloL (INDERAL) 80 MG tablet Take 3 times daily 270 tablet 3    rosuvastatin (CRESTOR) 10 MG tablet Take 1 tablet (10 mg total) by mouth once daily. 90 tablet 3    sertraline (ZOLOFT) 50 MG tablet Take one and half tablet every morning 135 tablet 3    aspirin 81 MG Chew Take 81 mg by mouth once daily.       No current facility-administered medications for this visit.     Review of patient's allergies indicates:  No Known Allergies    PMHx:  Past Medical History:   Diagnosis Date    Anxiety     Gallstones 2016    Migraine headache      Past Surgical History:   Procedure Laterality Date    ovary removed Right        Fhx:  Family History   Problem Relation Name Age of Onset    Heart disease Father      COPD Father      Diabetes Mother      Breast cancer Neg Hx      Ovarian cancer Neg Hx         Shx: + social etoh   Social History     Socioeconomic History    Marital status:    Tobacco Use    Smoking status: Former     Current packs/day: 0.00     Types: Cigarettes     Quit date:      Years since quittin.8    Smokeless tobacco: Never   Substance and Sexual Activity    Alcohol use: Yes     Alcohol/week: 1.0 - 2.0 standard drink of alcohol     Types: 1 - 2 Cans of beer per week     Comment: once month    Drug use: No    Sexual activity: Yes     Partners: Male     Social Drivers of Health     Financial Resource Strain: Low Risk  (2024)    Overall Financial Resource Strain (CARDIA)     Difficulty of Paying Living Expenses: Not very hard   Food Insecurity: No Food Insecurity (2024)    Hunger Vital Sign     Worried About Running Out of Food in the Last Year: Never true      Ran Out of Food in the Last Year: Never true   Physical Activity: Unknown (5/29/2024)    Exercise Vital Sign     Days of Exercise per Week: 0 days   Stress: No Stress Concern Present (5/29/2024)    [employer] - Occupational Stress Questionnaire     Feeling of Stress : Not at all   Housing Stability: Unknown (5/29/2024)    Housing Stability Vital Sign     Unable to Pay for Housing in the Last Year: No           Labs:   CMP  Sodium   Date Value Ref Range Status   10/11/2024 140 136 - 145 mmol/L Final     Potassium   Date Value Ref Range Status   10/11/2024 4.6 3.5 - 5.1 mmol/L Final     Comment:     Anion Gap reference range revised on 4/28/2023     Chloride   Date Value Ref Range Status   10/11/2024 103 95 - 110 mmol/L Final     CO2   Date Value Ref Range Status   10/11/2024 25 22 - 31 mmol/L Final     Glucose   Date Value Ref Range Status   10/11/2024 108 70 - 110 mg/dL Final     Comment:     The ADA recommends the following guidelines for fasting glucose:    Normal:       less than 100 mg/dL    Prediabetes:  100 mg/dL to 125 mg/dL    Diabetes:     126 mg/dL or higher       BUN   Date Value Ref Range Status   10/11/2024 19 (H) 7 - 18 mg/dL Final     Creatinine   Date Value Ref Range Status   10/11/2024 0.64 0.50 - 1.40 mg/dL Final     Calcium   Date Value Ref Range Status   10/11/2024 9.5 8.4 - 10.2 mg/dL Final     Total Protein   Date Value Ref Range Status   10/11/2024 8.3 6.0 - 8.4 g/dL Final     Albumin   Date Value Ref Range Status   10/11/2024 4.8 3.5 - 5.2 g/dL Final     Total Bilirubin   Date Value Ref Range Status   10/11/2024 0.9 0.2 - 1.3 mg/dL Final     Alkaline Phosphatase   Date Value Ref Range Status   10/11/2024 59 38 - 145 U/L Final     AST   Date Value Ref Range Status   10/11/2024 39 (H) 14 - 36 U/L Final     ALT   Date Value Ref Range Status   10/11/2024 38 (H) 0 - 35 U/L Final     Anion Gap   Date Value Ref Range Status   10/11/2024 12 5 - 12 mmol/L Final      Comment:     Anion Gap reference range revised on 4/28/2023     eGFR   Date Value Ref Range Status   10/11/2024 >60 >60 mL/min/1.73 m^2 Final         Imaging:   10/11/2024CT head (report): CLINICAL HISTORY:  Memory loss;     TECHNIQUE:  Low dose axial CT images obtained throughout the head without intravenous contrast. Sagittal and coronal reconstructions were performed.     COMPARISON:  MRI 10/21/2011.     FINDINGS:  Intracranial compartment:     Ventricles and sulci are normal in size for age without evidence of hydrocephalus. No extra-axial blood or fluid collections.     The brain parenchyma appears normal.  Prominent perivascular space in the left basal ganglia/insular region was present on MRI 10/21/2011.  No acute parenchymal hemorrhage or evolving major vascular distribution infarct identified.  No intracranial mass effect or midline shift.     Skull/extracranial contents (limited evaluation): No fracture. Small amount of aerated secretions in the right sphenoid sinus.  Mastoid air cells and remaining paranasal sinuses are essentially clear.     Impression:     No acute intracranial abnormality.     10/11/2024 CTA Head/neck (report): TECHNIQUE:  CT angiogram was performed from the level of the dominique to the top of the head following the IV administration of 80mL of Omnipaque 350.   Sagittal and coronal reconstructions and maximum intensity projection reconstructions were performed. Arterial stenosis percentages are based on NASCET measurement criteria.     COMPARISON:  None     FINDINGS:  Aorta: Normal 3 vessel arch.  Mild aortic atherosclerosis.     Extracranial carotid circulation: Medialization of the right CCA and proximal ICA as well as the left proximal ICA.  Beaded appearance of the bilateral cervical ICAs concerning for fibromuscular dysplasia.  No high-grade stenosis, aneurysmal dilatation, or dissection.     Extracranial vertebral circulation: The right vertebral artery is dominant.  No high-grade  "stenosis, aneurysmal dilatation, or dissection.     Intracranial Arteries: The P1 segments of the bilateral PCAs are mildly hypoplastic and somewhat tortuous, and the bilateral posterior communicating arteries are patent.  No focal high-grade stenosis, occlusion, or aneurysm identified.     Venous structures (limited evaluation): Normal.     Skull/Extracranial Contents (limited evaluation): No fracture. Mastoid air cells and paranasal sinuses are essentially clear.     Non-Vascular Structures of the Neck/Thoracic Inlet (limited evaluation): No acute abnormality identified.     Impression:     1. No acute abnormality. No high-grade stenosis or major vessel occlusion.  2. Beaded appearance of the bilateral cervical ICAs concerning for fibromuscular dysplasia.          Other testing:  Reviewed in chart     Note: I have independently reviewed any/all imaging/labs/tests and agree with the report (s) as documented.  Any discrepancies will be as noted/demarcated by free text.  MARA NEUMANN 10/15/2024                     ROS:   Review Of Systems (questions asked, positive or additions in BOLD)  Gen: Weight change, fatigue/malaise, pyrexia   HEENT: Tinnitus, headache,  blurred vision, eye pain, diplopia, photophobia,  nose bleeds, congestion, sore throat, jaw pain, scalp pain, neck stiffness   Card: Palpitations, CP   Pulm: SOB, cough   Vas: Easy bruising, easy bleeding   GI: N/V/D/C, incontinence, hematemesis, hematochezia    : incontinence, hematuria   Endocrine: Temp intolerance, polyuria, polydipsia   M/S: Neck pain, myalgia, back pain, joint pain, falls    Neuro: PER HPI   PSY: Memory loss, confusion, depression, anxiety, trouble in sleep, hallucinations          EXAM:   BP (!) 151/78   Pulse 67   Temp 97 °F (36.1 °C) (Temporal)   Resp 17   Ht 5' 5" (1.651 m)   Wt 103.5 kg (228 lb 2.8 oz)   BMI 37.97 kg/m²    GEN:  NAD  HEENT: NC/AT, Frontalis was NTTP, temporalis was NTTP,  nares patent, dentition appropriate neck " supple, trachea midline, Occiput mildly TTP on the right and trapezius NTTP     EXTREM:  no edema present.  Cap refill approx 2-3s LUE as tested   NEURO:  Mental Status:  Awake, alert and appropriately oriented to time, place, and person.  Normal attention and concentration.  Speech is fluent and appropriate language function (I.e., comprehension). Hand wringing present.  Able to do one evolution of serial 7s. Knew current and past POTUS x3, Gave arbitrary 3 objects to remember - after 10 mins with distraction had 2/3 recall.      Cranial Nerves:     Pupils are equal and reactive to light.  Extraocular movements are intact and without nystagmus.  Visual fields are full to confrontation testing.   Facial movement is symmetric.  Facial sensation is intact.  Hearing is normal. Uvula in midline. FROM of neck in all (6) directions, shoulder shrug symmetrical. Tongue in midline without fasiculation.     Motor:  RUE:appropriate against gravity and medium force as tested 5/5              LUE: appropriate against gravity and medium force as tested 5/5              RLE:appropriate against gravity and medium force as tested 5/5              LLE: appropriate against gravity and medium force as tested 5/5  Tremor/pronator drift not apparent.     finger extension strength was strong bilat    Sensory:  RUE  intact light touch, proprioception, and temperature  LUE intact light touch, proprioception, and temperature    RLE intact light touch  LLE intact light touch      DTR's:                                            R              L  biceps 2+ 2+         brachioradialis 2+ 2+   Knee jerk 2+ 2+      Rich was negative     Coordination:  FTN-WNL.      Gait and Stance: Normal manner of stance and gait function testing. Romberg was negative .          This document has been electronically signed by Mr. Basim Laura MPA, PA-C on 10/15/2024, I have personally typed this message using the EMR.       Dr Scot MD  was available  during today's visit.     Personal Protective Equipment:    Personal Protective Equipment was used during this encounter including:  non latex gloves.          CC: BEATRIZ Nugent MD

## 2024-11-21 ENCOUNTER — OFFICE VISIT (OUTPATIENT)
Dept: FAMILY MEDICINE | Facility: CLINIC | Age: 59
End: 2024-11-21
Payer: COMMERCIAL

## 2024-11-21 VITALS
OXYGEN SATURATION: 99 % | BODY MASS INDEX: 38.7 KG/M2 | DIASTOLIC BLOOD PRESSURE: 84 MMHG | WEIGHT: 232.25 LBS | SYSTOLIC BLOOD PRESSURE: 126 MMHG | HEART RATE: 75 BPM | HEIGHT: 65 IN

## 2024-11-21 DIAGNOSIS — R40.0 DAYTIME SOMNOLENCE: ICD-10-CM

## 2024-11-21 DIAGNOSIS — Z00.00 WELLNESS EXAMINATION: Primary | ICD-10-CM

## 2024-11-21 DIAGNOSIS — E66.01 MORBID OBESITY: ICD-10-CM

## 2024-11-21 DIAGNOSIS — R06.83 SNORING: ICD-10-CM

## 2024-11-21 PROCEDURE — 99999 PR PBB SHADOW E&M-EST. PATIENT-LVL III: CPT | Mod: PBBFAC,,, | Performed by: FAMILY MEDICINE

## 2024-11-21 RX ORDER — TIRZEPATIDE 2.5 MG/.5ML
2.5 INJECTION, SOLUTION SUBCUTANEOUS
Qty: 2 ML | Refills: 2 | Status: SHIPPED | OUTPATIENT
Start: 2024-11-21

## 2024-11-21 RX ORDER — HYDROXYZINE PAMOATE 25 MG/1
CAPSULE ORAL
COMMUNITY
Start: 2024-10-11 | End: 2024-11-21 | Stop reason: SDUPTHER

## 2024-11-21 RX ORDER — HYDROXYZINE PAMOATE 25 MG/1
25 CAPSULE ORAL DAILY PRN
Qty: 30 CAPSULE | Refills: 2 | Status: SHIPPED | OUTPATIENT
Start: 2024-11-21

## 2024-11-21 NOTE — PROGRESS NOTES
Subjective:       Patient ID: Rahcel Boswell is a 58 y.o. female.    Chief Complaint: Follow-up    Here for wellness and follow up multiple chronic medical issues. Doing well overall and in normal state of health.  Not much wt loss with contrave over 6 months.    Follow-up  Pertinent negatives include no chest pain, chills, coughing or fever.     Review of Systems   Constitutional:  Negative for chills and fever.   Respiratory:  Negative for cough, chest tightness and shortness of breath.    Cardiovascular:  Negative for chest pain, palpitations and leg swelling.   Endocrine: Negative for cold intolerance and heat intolerance.   Psychiatric/Behavioral:  Negative for decreased concentration. The patient is not nervous/anxious.        Objective:      Physical Exam  Vitals and nursing note reviewed.   Constitutional:       Appearance: She is well-developed.   HENT:      Head: Normocephalic and atraumatic.   Cardiovascular:      Rate and Rhythm: Normal rate and regular rhythm.      Heart sounds: Normal heart sounds.   Pulmonary:      Effort: Pulmonary effort is normal.      Breath sounds: Normal breath sounds.   Psychiatric:         Mood and Affect: Mood normal.         Behavior: Behavior normal.         Assessment:       1. Wellness examination    2. Snoring    3. Daytime somnolence    4. Morbid obesity        Plan:       Wellness examination    Snoring  -     Ambulatory referral/consult to Sleep Disorders; Future; Expected date: 11/28/2024    Daytime somnolence  -     Ambulatory referral/consult to Sleep Disorders; Future; Expected date: 11/28/2024    Morbid obesity  -     tirzepatide (MOUNJARO) 2.5 mg/0.5 mL PnIj; Inject 2.5 mg into the skin every 7 days.  Dispense: 2 mL; Refill: 2    Other orders  -     hydrOXYzine pamoate (VISTARIL) 25 MG Cap; Take 1 capsule (25 mg total) by mouth daily as needed.  Dispense: 30 capsule; Refill: 2    Neuro recommended fe workup  Labs in spring as planned  To sleep med  Will  monitor chronic medical issues and continue current plan of care.  Trial glp1 after discussion  Visit today included increased complexity associated with the care of the episodic problem obesity addressed and managing the longitudinal care of the patient due to the serious and/or complex managed problem(s) as above.      Follow up in about 6 months (around 5/21/2025), or if symptoms worsen or fail to improve.

## 2024-11-22 ENCOUNTER — TELEPHONE (OUTPATIENT)
Dept: FAMILY MEDICINE | Facility: CLINIC | Age: 59
End: 2024-11-22
Payer: COMMERCIAL

## 2024-11-22 ENCOUNTER — DOCUMENTATION ONLY (OUTPATIENT)
Dept: FAMILY MEDICINE | Facility: CLINIC | Age: 59
End: 2024-11-22
Payer: COMMERCIAL

## 2024-11-22 NOTE — PROGRESS NOTES
Rachel Boswell (Key: [id number])  PA Case ID #: [id number]  Rx #: [id number]  Need Help? Call us at [phone]  Outcome  Denied on November 22 by [employer] 2017 NCPDP  Request Reference Number: [id number]. MOUNJARO INJ 2.5/0.5 is denied for not meeting the prior authorization requirement(s). Details of this decision are in the notice attached below or have been faxed to you.  Drug  Mounjaro 2.5MG/0.5ML auto-injectors    Form  [employer] Electronic Prior Authorization Form (2017 NCPDP)  Original Claim Info  75    Rachel Boswell (Key: [id number])  PA Case ID #: [id number]  Rx #: [id number]  Need Help? Call us at [phone]  Status  Sent to Plan today  Drug  Mounjaro 2.5MG/0.5ML auto-injectors    Form  [employer] Electronic Prior Authorization Form (2017 NCPDP)  Original Claim Info  75

## 2024-11-22 NOTE — TELEPHONE ENCOUNTER
Medication  tirzepatide (MOUNJARO) 2.5 mg/0.5 mL PnIj [[id number]]  Outpatient Medication Detail     Disp Refills Start End CHARLA   tirzepatide (MOUNJARO) 2.5 mg/0.5 mL PnIj 2 mL 2 11/21/2024 -- No   Sig - Route: Inject 2.5 mg into the skin every 7 days. - Subcutaneous   Sent to pharmacy as: tirzepatide (MOUNJARO) 2.5 mg/0.5 mL PnIj   Class: Normal   Order: [id number]   Date/Time Signed: 11/21/2024 08:47       E-Prescribing Status: Receipt confirmed by pharmacy (11/21/2024  8:47 AM CST)     Pharmacy    [id number] [place] [address] [address] AT [facility] [address]    Associated Diagnoses    Morbid obesity         Relevant Medication Information  This message is in regards to GLP-1.     Under the patient's insurance plan  this medication is ONLY approved for the following diagnoses: TYPE 2 DIABETES with a Hemoglobin A1C of 6.5 or greater.   After reviewing the patient's chart, there was not information that the patient has that diagnosis.   Insurance companies states that any diagnosis such as the following: METABOLIC SYNDROME, INSULIN RESISTANCE, PREDIABETES, WEIGHT LOSS MANAGEMENT, OR OBESITY will not be approved as these are not approved by the FDA.     Victoza, Ozempic , Trulicity, and Mounjaro are ONLY approved for Type 2 Diabetes and no other indication per FDA.     The patient does not meet the criteria to submit a prior authorization. If there is more information you would like to provide that would help the patient meet the criteria, please do not hesitate to reach out to us.     Thank you.     Sincerely      Family Practice   Special Services Coordinators  Prior Authorization Department

## 2025-02-06 DIAGNOSIS — G43.009 MIGRAINE WITHOUT AURA AND WITHOUT STATUS MIGRAINOSUS, NOT INTRACTABLE: ICD-10-CM

## 2025-02-06 RX ORDER — PROPRANOLOL HYDROCHLORIDE 80 MG/1
TABLET ORAL
Qty: 270 TABLET | Refills: 3 | Status: SHIPPED | OUTPATIENT
Start: 2025-02-06

## 2025-02-06 NOTE — TELEPHONE ENCOUNTER
Care Due:                  Date            Visit Type   Department     Provider  --------------------------------------------------------------------------------                                EP -                              [facility]  Last Visit: 11-      CARE ([facility])   BRYAN ERICKSON                              EP -                              PRIMARY      [facility]  Next Visit: 05-      CARE ([facility])   BRYAN ERICKSON                                                            Last  Test          Frequency    Reason                     Performed    Due Date  --------------------------------------------------------------------------------    HBA1C.......  6 months...  tirzepatide..............  02- 08-    [facility] Embedded Care Due Messages. Reference number: [id number].   2/06/2025 11:39:42 AM CST

## 2025-02-06 NOTE — TELEPHONE ENCOUNTER
Refill Routing Note   Medication(s) are not appropriate for processing by [facility] for the following reason(s):        No active prescription written by provider    [facility] action(s):  Defer        Medication Therapy Plan: FLOS; Last ordered: 1 year ago (11/29/2023) by Temitope Ayers DNP, APRN      Appointments  past 12m or future 3m with PCP    Date Provider   Last Visit   11/21/2024 BEATRIZ Nugent MD   Next Visit   5/22/2025 BEATRIZ Nugent MD   ED visits in past 90 days: 0        Note composed:12:04 PM 02/06/2025

## 2025-02-07 ENCOUNTER — DOCUMENTATION ONLY (OUTPATIENT)
Dept: FAMILY MEDICINE | Facility: CLINIC | Age: 60
End: 2025-02-07
Payer: COMMERCIAL

## 2025-02-07 ENCOUNTER — TELEPHONE (OUTPATIENT)
Dept: FAMILY MEDICINE | Facility: CLINIC | Age: 60
End: 2025-02-07
Payer: COMMERCIAL

## 2025-02-07 NOTE — TELEPHONE ENCOUNTER
----- Message from Med Assistant Johnson sent at 2/6/2025  7:35 PM CST -----  Regarding: FW: PA for ramos  Contact: pharm  This message is in regards to GLP-1. Mounjaro    Under the patient's insurance plan  this medication is ONLY approved for the following diagnoses: TYPE 2 DIABETES with a Hemoglobin A1C of 6.5 or greater.   After reviewing the patient's chart, there was not information that the patient has that diagnosis.   Insurance companies states that any diagnosis such as the following: METABOLIC SYNDROME, INSULIN RESISTANCE, PREDIABETES, WEIGHT LOSS MANAGEMENT, OR OBESITY will not be approved as these are not approved by the FDA.     Victoza, Ozempic , Trulicity, and Mounjaro are ONLY approved for Type 2 Diabetes and no other indication per FDA.     The patient does not meet the criteria to submit a prior authorization. If there is more information you would like to provide that would help the patient meet the criteria, please do not hesitate to reach out to us.     Thank you.     Sincerely      Family Practice   Special Services Coordinators  Prior Authorization Department     PA initiated however pt do not meet the criteria for this RX.   Rachel Boswell (Key: [id number])    [employer] is reviewing your PA request. Typically an electronic response will be received within 24-72 hours. To check for an update later, open this request from your dashboard.  You may close this dialog and return to your dashboard to perform other tasks.  ----- Message -----  From: Farnaz Forbes MA  Sent: 2/6/2025  12:30 PM CST  To: Elizabeth Paiz MA  Subject: FW: PA for monjaro                                 ----- Message -----  From: Jennifer Brennan  Sent: 2/6/2025  12:13 PM CST  To: Raffi Richards Staff  Subject: PA for monjaro                                   Type:  Pharmacy Calling to Clarify an RX    Name of Caller:pharm    Pharmacy Name:  [facility][id number] AT [facility]  [address] [address]  Phone: [phone] Fax: [phone]    Prescription Name: ramos    What do they need to clarify?:DANIELA jacob    Best Call Back Number:[phone]

## 2025-02-16 DIAGNOSIS — G43.009 MIGRAINE WITHOUT AURA AND WITHOUT STATUS MIGRAINOSUS, NOT INTRACTABLE: ICD-10-CM

## 2025-02-16 DIAGNOSIS — E78.2 MIXED HYPERLIPIDEMIA: ICD-10-CM

## 2025-02-16 DIAGNOSIS — F41.9 ANXIETY: ICD-10-CM

## 2025-02-17 RX ORDER — ROSUVASTATIN CALCIUM 10 MG/1
10 TABLET, COATED ORAL DAILY
Qty: 90 TABLET | Refills: 3 | Status: SHIPPED | OUTPATIENT
Start: 2025-02-17

## 2025-02-17 RX ORDER — SERTRALINE HYDROCHLORIDE 50 MG/1
TABLET, FILM COATED ORAL
Qty: 135 TABLET | Refills: 3 | Status: SHIPPED | OUTPATIENT
Start: 2025-02-17

## 2025-02-18 ENCOUNTER — DOCUMENTATION ONLY (OUTPATIENT)
Dept: FAMILY MEDICINE | Facility: CLINIC | Age: 60
End: 2025-02-18
Payer: COMMERCIAL

## 2025-02-18 NOTE — PROGRESS NOTES
Rachel Boswell (Key: [id number])  PA Case ID #: [id number]  Need Help? Call us at [phone]  Outcome  N/A on February 18 by [employer] 2017 NCPDP  This medication or product is on your plan's list of covered drugs. Prior authorization is not required at this time. If your pharmacy has questions regarding the processing of your prescription, please have them call the [employer] pharmacy help desk at [phone]. **Please note: This request was submitted electronically. Formulary lowering, tiering exception, cost reduction and/or pre-benefit determination review (including prospective Medicare hospice reviews) requests cannot be requested using this method of submission. Providers contact us at [phone] for further assistance.  Drug  Sertraline HCl 50MG tablets    Form  [employer] Electronic Prior Authorization Form (2017 NCPDP)  Rachel Boswell ([place]: [id number])  PA Case ID #: [id number]  Need Help? Call us at [phone]  Status  Sent to Plan today  Drug  Sertraline HCl 50MG tablets    Form  [employer] Electronic Prior Authorization Form (2017 NCPDP)

## 2025-03-26 DIAGNOSIS — Z12.31 OTHER SCREENING MAMMOGRAM: ICD-10-CM

## 2025-04-24 ENCOUNTER — HOSPITAL ENCOUNTER (OUTPATIENT)
Dept: RADIOLOGY | Facility: HOSPITAL | Age: 60
Discharge: HOME | End: 2025-04-24
Attending: FAMILY MEDICINE
Payer: COMMERCIAL

## 2025-04-24 DIAGNOSIS — Z12.31 OTHER SCREENING MAMMOGRAM: ICD-10-CM

## 2025-04-24 PROCEDURE — 77067 SCR MAMMO BI INCL CAD: CPT | Mod: 26,,, | Performed by: RADIOLOGY

## 2025-04-24 PROCEDURE — 77067 SCR MAMMO BI INCL CAD: CPT | Mod: TC,PO

## 2025-04-24 PROCEDURE — 77063 BREAST TOMOSYNTHESIS BI: CPT | Mod: 26,,, | Performed by: RADIOLOGY

## 2025-04-25 ENCOUNTER — RESULTS FOLLOW-UP (OUTPATIENT)
Dept: FAMILY MEDICINE | Facility: CLINIC | Age: 60
End: 2025-04-25

## 2025-05-15 ENCOUNTER — LAB VISIT (OUTPATIENT)
Dept: LAB | Facility: HOSPITAL | Age: 60
End: 2025-05-15
Attending: FAMILY MEDICINE
Payer: COMMERCIAL

## 2025-05-15 DIAGNOSIS — Z00.00 WELLNESS EXAMINATION: ICD-10-CM

## 2025-05-15 LAB
ALBUMIN SERPL BCP-MCNC: 3.8 G/DL (ref 3.5–5.2)
ALP SERPL-CCNC: 60 UNIT/L (ref 40–150)
ALT SERPL W/O P-5'-P-CCNC: 22 UNIT/L (ref 10–44)
ANION GAP (OHS): 11 MMOL/L (ref 8–16)
AST SERPL-CCNC: 20 UNIT/L (ref 11–45)
BILIRUB SERPL-MCNC: 0.5 MG/DL (ref 0.1–1)
BUN SERPL-MCNC: 20 MG/DL (ref 8–23)
CALCIUM SERPL-MCNC: 9.3 MG/DL (ref 8.7–10.5)
CHLORIDE SERPL-SCNC: 110 MMOL/L (ref 95–110)
CHOLEST SERPL-MCNC: 178 MG/DL (ref 120–199)
CHOLEST/HDLC SERPL: 4.2 {RATIO} (ref 2–5)
CO2 SERPL-SCNC: 22 MMOL/L (ref 23–29)
CREAT SERPL-MCNC: 0.6 MG/DL (ref 0.5–1.4)
EAG (OHS): 105 MG/DL (ref 68–131)
ERYTHROCYTE [DISTWIDTH] IN BLOOD BY AUTOMATED COUNT: 13 % (ref 11.5–14.5)
GFR SERPLBLD CREATININE-BSD FMLA CKD-EPI: >60 ML/MIN/1.73/M2
GLUCOSE SERPL-MCNC: 96 MG/DL (ref 70–110)
HBA1C MFR BLD: 5.3 % (ref 4–5.6)
HCT VFR BLD AUTO: 40.3 % (ref 37–48.5)
HDLC SERPL-MCNC: 42 MG/DL (ref 40–75)
HDLC SERPL: 23.6 % (ref 20–50)
HGB BLD-MCNC: 12.6 GM/DL (ref 12–16)
LDLC SERPL CALC-MCNC: 106.8 MG/DL (ref 63–159)
MCH RBC QN AUTO: 28 PG (ref 27–31)
MCHC RBC AUTO-ENTMCNC: 31.3 G/DL (ref 32–36)
MCV RBC AUTO: 90 FL (ref 82–98)
NONHDLC SERPL-MCNC: 136 MG/DL
PLATELET # BLD AUTO: 286 K/UL (ref 150–450)
PMV BLD AUTO: 10.8 FL (ref 9.2–12.9)
POTASSIUM SERPL-SCNC: 4.7 MMOL/L (ref 3.5–5.1)
PROT SERPL-MCNC: 7.2 GM/DL (ref 6–8.4)
RBC # BLD AUTO: 4.5 M/UL (ref 4–5.4)
SODIUM SERPL-SCNC: 143 MMOL/L (ref 136–145)
TRIGL SERPL-MCNC: 146 MG/DL (ref 30–150)
TSH SERPL-ACNC: 1.53 UIU/ML (ref 0.4–4)
WBC # BLD AUTO: 5.63 K/UL (ref 3.9–12.7)

## 2025-05-15 PROCEDURE — 36415 COLL VENOUS BLD VENIPUNCTURE: CPT | Mod: PO

## 2025-05-15 PROCEDURE — 84443 ASSAY THYROID STIM HORMONE: CPT

## 2025-05-15 PROCEDURE — 83036 HEMOGLOBIN GLYCOSYLATED A1C: CPT

## 2025-05-15 PROCEDURE — 80061 LIPID PANEL: CPT

## 2025-05-15 PROCEDURE — 80053 COMPREHEN METABOLIC PANEL: CPT

## 2025-05-15 PROCEDURE — 85027 COMPLETE CBC AUTOMATED: CPT

## 2025-05-16 ENCOUNTER — RESULTS FOLLOW-UP (OUTPATIENT)
Dept: FAMILY MEDICINE | Facility: CLINIC | Age: 60
End: 2025-05-16

## 2025-07-04 ENCOUNTER — TELEPHONE (OUTPATIENT)
Dept: FAMILY MEDICINE | Facility: CLINIC | Age: 60
End: 2025-07-04
Payer: COMMERCIAL

## 2025-07-11 ENCOUNTER — OFFICE VISIT (OUTPATIENT)
Dept: FAMILY MEDICINE | Facility: CLINIC | Age: 60
End: 2025-07-11
Payer: COMMERCIAL

## 2025-07-11 VITALS
WEIGHT: 219.56 LBS | TEMPERATURE: 98 F | OXYGEN SATURATION: 98 % | HEART RATE: 79 BPM | BODY MASS INDEX: 36.58 KG/M2 | HEIGHT: 65 IN | DIASTOLIC BLOOD PRESSURE: 70 MMHG | SYSTOLIC BLOOD PRESSURE: 122 MMHG

## 2025-07-11 DIAGNOSIS — Z86.0100 HX OF COLONIC POLYP: ICD-10-CM

## 2025-07-11 DIAGNOSIS — G43.909 MIGRAINE WITHOUT STATUS MIGRAINOSUS, NOT INTRACTABLE, UNSPECIFIED MIGRAINE TYPE: ICD-10-CM

## 2025-07-11 DIAGNOSIS — F41.9 ANXIETY: Primary | ICD-10-CM

## 2025-07-11 DIAGNOSIS — H61.23 BILATERAL IMPACTED CERUMEN: ICD-10-CM

## 2025-07-11 DIAGNOSIS — E66.9 OBESITY (BMI 30-39.9): ICD-10-CM

## 2025-07-11 DIAGNOSIS — Z12.11 COLON CANCER SCREENING: ICD-10-CM

## 2025-07-11 PROCEDURE — 99999 PR PBB SHADOW E&M-EST. PATIENT-LVL III: CPT | Mod: PBBFAC,,, | Performed by: NURSE PRACTITIONER

## 2025-07-11 RX ORDER — TIRZEPATIDE 5 MG/.5ML
5 INJECTION, SOLUTION SUBCUTANEOUS
COMMUNITY
End: 2025-07-11

## 2025-07-11 RX ORDER — HYDROXYZINE PAMOATE 25 MG/1
25 CAPSULE ORAL EVERY 8 HOURS PRN
Qty: 60 CAPSULE | Refills: 2 | Status: SHIPPED | OUTPATIENT
Start: 2025-07-11

## 2025-07-11 NOTE — PROGRESS NOTES
"Subjective:       Patient ID: Rachel Boswell is a 59 y.o. female.    Chief Complaint: Follow-up (6 month follow up/ lab results  )    HPI  6 month follow up and labs. Labs from 5/15/25 were all normal.    Anxiety stable with zoloft and prn hydroxyzine.    Migraines well controlled with propranolol.    Colonoscopy due. Last colonoscopy 8 yrs ago+ polyp and told to repeat in 8 yrs.    Mammogram 4/2025 normal.    Has PAP with Dr Ford every 2 yrs.     Was on tirzepitide 5 mg for weight loss with a med spa and it wasn't effective. Taking her friend's mounjaro and it is working well. Has lost 12 lbs over the past 8 mos.    Past Medical History:   Diagnosis Date    Anxiety     Gallstones 2016    Migraine headache        Past Surgical History:   Procedure Laterality Date    ovary removed Right        Review of patient's allergies indicates:  No Known Allergies    Social History[1]    Medications Ordered Prior to Encounter[2]    Family History   Problem Relation Name Age of Onset    Heart disease Father      COPD Father      Diabetes Mother      Breast cancer Neg Hx      Ovarian cancer Neg Hx         Review of Systems   Constitutional: Negative.    HENT: Negative.     Eyes: Negative.    Respiratory: Negative.     Cardiovascular: Negative.    Gastrointestinal: Negative.    Endocrine: Negative.    Genitourinary: Negative.    Musculoskeletal: Negative.    Skin: Negative.    Neurological: Negative.    Psychiatric/Behavioral: Negative.         Objective:      /70 (Patient Position: Sitting)   Pulse 79   Temp 97.6 °F (36.4 °C) (Oral)   Ht 5' 5" (1.651 m)   Wt 99.6 kg (219 lb 9.3 oz)   SpO2 98%   BMI 36.54 kg/m²   Physical Exam  Vitals and nursing note reviewed.   Constitutional:       General: She is not in acute distress.     Appearance: Normal appearance. She is well-groomed.   HENT:      Head: Normocephalic.      Right Ear: External ear normal. There is impacted cerumen.      Left Ear: External ear normal. There " is impacted cerumen.      Nose: Nose normal.      Mouth/Throat:      Lips: Pink.      Mouth: Mucous membranes are moist.      Pharynx: Oropharynx is clear. No oropharyngeal exudate or posterior oropharyngeal erythema.   Eyes:      Extraocular Movements: Extraocular movements intact.      Conjunctiva/sclera: Conjunctivae normal.      Pupils: Pupils are equal, round, and reactive to light.   Cardiovascular:      Rate and Rhythm: Normal rate and regular rhythm.      Heart sounds: Normal heart sounds. No murmur heard.  Pulmonary:      Effort: Pulmonary effort is normal.      Breath sounds: Normal breath sounds.   Chest:      Chest wall: No tenderness.   Abdominal:      General: Bowel sounds are normal.      Palpations: Abdomen is soft.      Tenderness: There is no abdominal tenderness.   Musculoskeletal:         General: No swelling or tenderness. Normal range of motion.      Cervical back: Normal range of motion and neck supple.      Right lower leg: No edema.      Left lower leg: No edema.   Lymphadenopathy:      Cervical: No cervical adenopathy.   Skin:     General: Skin is warm and dry.   Neurological:      General: No focal deficit present.      Mental Status: She is alert and oriented to person, place, and time. Mental status is at baseline.      Gait: Gait is intact.   Psychiatric:         Mood and Affect: Mood normal.         Behavior: Behavior normal.         Assessment:       1. Bilateral impacted cerumen    2. Migraine without status migrainosus, not intractable, unspecified migraine type    3. Anxiety    4. Hx of colonic polyp    5. Colon cancer screening    6. Obesity (BMI 30-39.9)        Plan:       Bilateral impacted cerumen  -     carbamide peroxide (DEBROX) 6.5 % otic solution; Place 5 drops into both ears 2 (two) times daily.  Dispense: 15 mL; Refill: 0    Migraine without status migrainosus, not intractable, unspecified migraine type    Anxiety    Hx of colonic polyp  -     Case Request Endoscopy:  COLONOSCOPY    Colon cancer screening  -     Case Request Endoscopy: COLONOSCOPY    Obesity (BMI 30-39.9)    Other orders  -     hydrOXYzine pamoate (VISTARIL) 25 MG Cap; Take 1 capsule (25 mg total) by mouth every 8 (eight) hours as needed (anxiety).  Dispense: 60 capsule; Refill: 2        Anxiety: continue zoloft and hydroxyzine    Migraines: continue propranolol daily    Hyperlipidemia: continue statin. Low cholesterol diet and exercise at least 150 minutes per week. Eat lean protein such as chicken, turkey or fish. Limit red meat. Limit sugar and starchy foods. Avoid fried, greasy and fast food. Low fat or non-fat dairy only.     Obesity: pt encouraged to contact her insurance company to find out what is covered for weight loss.    Cerumen impaction: debrox drops. RTC if no improvement.    Counseled on regular exercise, maintenance of a healthy weight, balanced diet rich in fruits/vegetables and lean protein, and avoidance of unhealthy habits like smoking and excessive alcohol intake.         [1]   Social History  Socioeconomic History    Marital status:    Tobacco Use    Smoking status: Former     Current packs/day: 0.00     Types: Cigarettes     Quit date:      Years since quittin.5    Smokeless tobacco: Never   Substance and Sexual Activity    Alcohol use: Yes     Alcohol/week: 1.0 - 2.0 standard drink of alcohol     Types: 1 - 2 Cans of beer per week     Comment: once month    Drug use: No    Sexual activity: Yes     Partners: Male     Social Drivers of Health     Financial Resource Strain: Low Risk  (5/15/2025)    Overall Financial Resource Strain (CARDIA)     Difficulty of Paying Living Expenses: Not hard at all   Food Insecurity: No Food Insecurity (5/15/2025)    Hunger Vital Sign     Worried About Running Out of Food in the Last Year: Never true     Ran Out of Food in the Last Year: Never true   Transportation Needs: No Transportation Needs (5/15/2025)    PRAPARE - Transportation     Lack  of Transportation (Medical): No     Lack of Transportation (Non-Medical): No   Physical Activity: Unknown (5/29/2024)    Exercise Vital Sign     Days of Exercise per Week: 0 days   Stress: Stress Concern Present (5/15/2025)    [employer] - Occupational Stress Questionnaire     Feeling of Stress : Rather much   Housing Stability: Low Risk  (5/15/2025)    Housing Stability Vital Sign     Unable to Pay for Housing in the Last Year: No     Number of Times Moved in the Last Year: 0     Homeless in the Last Year: No   [2]   Current Outpatient Medications on File Prior to Visit   Medication Sig Dispense Refill    aspirin 81 MG Chew Take 81 mg by mouth once daily.      propranoloL (INDERAL) 80 MG tablet TAKE 1 TABLET BY MOUTH THREE TIMES DAILY 270 tablet 3    rosuvastatin (CRESTOR) 10 MG tablet Take 1 tablet (10 mg total) by mouth once daily. 90 tablet 3    sertraline (ZOLOFT) 50 MG tablet TAKE 1 AND 1/2 TABLETS BY MOUTH EVERY MORNING 135 tablet 3    [DISCONTINUED] hydrOXYzine pamoate (VISTARIL) 25 MG Cap Take 1 capsule (25 mg total) by mouth daily as needed. 30 capsule 2    [DISCONTINUED] eletriptan (RELPAX) 40 MG tablet TAKE 1 TABLET BY MOUTH AS NEEDED FOR HEADACHE; MAY REPEAT IN 2 HOURS IF NECESSARY 15 tablet 2    [DISCONTINUED] naltrexone-bupropion (CONTRAVE) 8-90 mg TbSR Take 1 tablet by mouth 2 (two) times daily. 60 tablet 2    [DISCONTINUED] tirzepatide (MOUNJARO) 2.5 mg/0.5 mL PnIj Inject 2.5 mg into the skin every 7 days. 2 mL 2    [DISCONTINUED] tirzepatide (MOUNJARO) 5 mg/0.5 mL PnIj Inject 5 mg into the skin every 7 days.       No current facility-administered medications on file prior to visit.

## 2025-07-23 ENCOUNTER — TELEPHONE (OUTPATIENT)
Dept: GASTROENTEROLOGY | Facility: CLINIC | Age: 60
End: 2025-07-23
Payer: COMMERCIAL

## 2025-07-23 NOTE — TELEPHONE ENCOUNTER
Called pt to schedule colon screening appointment, no answer, left vm with callback number. [employer] msg sent

## 2025-08-05 ENCOUNTER — TELEPHONE (OUTPATIENT)
Dept: GASTROENTEROLOGY | Facility: CLINIC | Age: 60
End: 2025-08-05
Payer: COMMERCIAL